# Patient Record
Sex: MALE | Race: WHITE | Employment: OTHER | URBAN - METROPOLITAN AREA
[De-identification: names, ages, dates, MRNs, and addresses within clinical notes are randomized per-mention and may not be internally consistent; named-entity substitution may affect disease eponyms.]

---

## 2017-11-10 NOTE — LETTER
Tanesha Hightower [address] [facility] [address], [address] [place] [address]    Consent for Operation  Date: __5/31/19________________                                Time: ______0940_________    1.  I authorize the performance upon Didi Shaw the following operation procedure has been videotaped, the surgeon will obtain the original videotape. The hospital will not be responsible for storage or maintenance of this tape.   7. For the purpose of advancing medical education, I consent to the admittance of observers to the STATEMENTS REQUIRING INSERTION OR COMPLETION WERE FILLED IN.     Signature of Patient:   ___________________________    When the patient is a minor or mentally incompetent to give consent:  Signature of person authorized to consent for patient: ____________ supplements, and pills I can buy without a prescription (including street drugs/illegal medications). Failure to inform my anesthesiologist about these medicines may increase my risk of anesthetic complications. iv.  If I am allergic to anything or have ha Anesthesiologist Signature     Date   Time  I have discussed the procedure and information above with the patient (or patient’s representative) and answered their questions. The patient or their representative has agreed to have anesthesia services.     ___ epigastric area

## 2018-01-26 PROBLEM — K21.9 GASTROESOPHAGEAL REFLUX DISEASE WITHOUT ESOPHAGITIS: Status: ACTIVE | Noted: 2018-01-26

## 2018-02-12 RX ORDER — PANTOPRAZOLE SODIUM 40 MG/1
40 TABLET, DELAYED RELEASE ORAL
Status: ON HOLD | COMMUNITY
End: 2019-01-01

## 2018-02-12 RX ORDER — TRAMADOL HYDROCHLORIDE 50 MG/1
50 TABLET ORAL EVERY 12 HOURS PRN
Status: ON HOLD | COMMUNITY
End: 2019-01-01

## 2018-02-12 RX ORDER — CHOLECALCIFEROL (VITAMIN D3) 50 MCG
1 TABLET ORAL DAILY
Status: ON HOLD | COMMUNITY
End: 2019-01-01

## 2018-02-12 RX ORDER — GABAPENTIN 300 MG/1
300 CAPSULE ORAL 2 TIMES DAILY
Status: ON HOLD | COMMUNITY
End: 2019-01-01

## 2018-02-13 ENCOUNTER — HOSPITAL ENCOUNTER (OUTPATIENT)
Facility: HOSPITAL | Age: 58
Setting detail: HOSPITAL OUTPATIENT SURGERY
Discharge: HOME | End: 2018-02-13
Attending: INTERNAL MEDICINE | Admitting: INTERNAL MEDICINE
Payer: MEDICAID

## 2018-02-13 ENCOUNTER — ANESTHESIA (OUTPATIENT)
Dept: ENDOSCOPY | Facility: HOSPITAL | Age: 58
End: 2018-02-13
Payer: MEDICAID

## 2018-02-13 ENCOUNTER — SURGERY (OUTPATIENT)
Age: 58
End: 2018-02-13

## 2018-02-13 ENCOUNTER — ANESTHESIA EVENT (OUTPATIENT)
Dept: ENDOSCOPY | Facility: HOSPITAL | Age: 58
End: 2018-02-13
Payer: MEDICAID

## 2018-02-13 DIAGNOSIS — K21.9 GASTROESOPHAGEAL REFLUX DISEASE WITHOUT ESOPHAGITIS: ICD-10-CM

## 2018-02-13 DIAGNOSIS — Z12.11 COLON CANCER SCREENING: ICD-10-CM

## 2018-02-13 PROCEDURE — 88312 SPECIAL STAINS GROUP 1: CPT | Performed by: INTERNAL MEDICINE

## 2018-02-13 PROCEDURE — 0DB68ZX EXCISION OF STOMACH, VIA NATURAL OR ARTIFICIAL OPENING ENDOSCOPIC, DIAGNOSTIC: ICD-10-PCS | Performed by: INTERNAL MEDICINE

## 2018-02-13 PROCEDURE — 06L38CZ OCCLUSION OF ESOPHAGEAL VEIN WITH EXTRALUMINAL DEVICE, VIA NATURAL OR ARTIFICIAL OPENING ENDOSCOPIC: ICD-10-PCS | Performed by: INTERNAL MEDICINE

## 2018-02-13 PROCEDURE — 88305 TISSUE EXAM BY PATHOLOGIST: CPT | Performed by: INTERNAL MEDICINE

## 2018-02-13 PROCEDURE — 0DJD8ZZ INSPECTION OF LOWER INTESTINAL TRACT, VIA NATURAL OR ARTIFICIAL OPENING ENDOSCOPIC: ICD-10-PCS | Performed by: INTERNAL MEDICINE

## 2018-02-13 PROCEDURE — 0DB48ZX EXCISION OF ESOPHAGOGASTRIC JUNCTION, VIA NATURAL OR ARTIFICIAL OPENING ENDOSCOPIC, DIAGNOSTIC: ICD-10-PCS | Performed by: INTERNAL MEDICINE

## 2018-02-13 RX ORDER — SODIUM CHLORIDE, SODIUM LACTATE, POTASSIUM CHLORIDE, CALCIUM CHLORIDE 600; 310; 30; 20 MG/100ML; MG/100ML; MG/100ML; MG/100ML
INJECTION, SOLUTION INTRAVENOUS CONTINUOUS
Status: DISCONTINUED | OUTPATIENT
Start: 2018-02-13 | End: 2018-02-13

## 2018-02-13 RX ORDER — HYDROCODONE BITARTRATE AND ACETAMINOPHEN 5; 325 MG/1; MG/1
1 TABLET ORAL AS NEEDED
Status: DISCONTINUED | OUTPATIENT
Start: 2018-02-13 | End: 2018-02-13

## 2018-02-13 RX ORDER — GLYCOPYRROLATE 0.2 MG/ML
INJECTION, SOLUTION INTRAMUSCULAR; INTRAVENOUS AS NEEDED
Status: DISCONTINUED | OUTPATIENT
Start: 2018-02-13 | End: 2018-02-13 | Stop reason: SURG

## 2018-02-13 RX ORDER — MORPHINE SULFATE 4 MG/ML
2 INJECTION, SOLUTION INTRAMUSCULAR; INTRAVENOUS EVERY 10 MIN PRN
Status: DISCONTINUED | OUTPATIENT
Start: 2018-02-13 | End: 2018-02-13

## 2018-02-13 RX ORDER — CEFAZOLIN SODIUM 1 G/3ML
INJECTION, POWDER, FOR SOLUTION INTRAMUSCULAR; INTRAVENOUS AS NEEDED
Status: DISCONTINUED | OUTPATIENT
Start: 2018-02-13 | End: 2018-02-13 | Stop reason: SURG

## 2018-02-13 RX ORDER — MORPHINE SULFATE 4 MG/ML
4 INJECTION, SOLUTION INTRAMUSCULAR; INTRAVENOUS EVERY 10 MIN PRN
Status: DISCONTINUED | OUTPATIENT
Start: 2018-02-13 | End: 2018-02-13

## 2018-02-13 RX ORDER — LIDOCAINE HYDROCHLORIDE 10 MG/ML
INJECTION, SOLUTION EPIDURAL; INFILTRATION; INTRACAUDAL; PERINEURAL AS NEEDED
Status: DISCONTINUED | OUTPATIENT
Start: 2018-02-13 | End: 2018-02-13 | Stop reason: SURG

## 2018-02-13 RX ORDER — ONDANSETRON 2 MG/ML
4 INJECTION INTRAMUSCULAR; INTRAVENOUS ONCE AS NEEDED
Status: DISCONTINUED | OUTPATIENT
Start: 2018-02-13 | End: 2018-02-13

## 2018-02-13 RX ORDER — MORPHINE SULFATE 10 MG/ML
6 INJECTION, SOLUTION INTRAMUSCULAR; INTRAVENOUS EVERY 10 MIN PRN
Status: DISCONTINUED | OUTPATIENT
Start: 2018-02-13 | End: 2018-02-13

## 2018-02-13 RX ORDER — RANITIDINE 150 MG/1
150 TABLET ORAL 2 TIMES DAILY
Status: ON HOLD | COMMUNITY
End: 2019-01-01

## 2018-02-13 RX ORDER — HALOPERIDOL 5 MG/ML
0.25 INJECTION INTRAMUSCULAR ONCE AS NEEDED
Status: DISCONTINUED | OUTPATIENT
Start: 2018-02-13 | End: 2018-02-13

## 2018-02-13 RX ORDER — NALOXONE HYDROCHLORIDE 0.4 MG/ML
80 INJECTION, SOLUTION INTRAMUSCULAR; INTRAVENOUS; SUBCUTANEOUS AS NEEDED
Status: DISCONTINUED | OUTPATIENT
Start: 2018-02-13 | End: 2018-02-13

## 2018-02-13 RX ORDER — HYDROCODONE BITARTRATE AND ACETAMINOPHEN 5; 325 MG/1; MG/1
2 TABLET ORAL AS NEEDED
Status: DISCONTINUED | OUTPATIENT
Start: 2018-02-13 | End: 2018-02-13

## 2018-02-13 RX ADMIN — CEFAZOLIN SODIUM 2 G: 1 INJECTION, POWDER, FOR SOLUTION INTRAMUSCULAR; INTRAVENOUS at 09:00:00

## 2018-02-13 RX ADMIN — GLYCOPYRROLATE 0.2 MG: 0.2 INJECTION, SOLUTION INTRAMUSCULAR; INTRAVENOUS at 08:49:00

## 2018-02-13 RX ADMIN — SODIUM CHLORIDE, SODIUM LACTATE, POTASSIUM CHLORIDE, CALCIUM CHLORIDE: 600; 310; 30; 20 INJECTION, SOLUTION INTRAVENOUS at 08:45:00

## 2018-02-13 RX ADMIN — SODIUM CHLORIDE, SODIUM LACTATE, POTASSIUM CHLORIDE, CALCIUM CHLORIDE: 600; 310; 30; 20 INJECTION, SOLUTION INTRAVENOUS at 09:00:00

## 2018-02-13 RX ADMIN — LIDOCAINE HYDROCHLORIDE 100 MG: 10 INJECTION, SOLUTION EPIDURAL; INFILTRATION; INTRACAUDAL; PERINEURAL at 08:49:00

## 2018-02-13 NOTE — ANESTHESIA PREPROCEDURE EVALUATION
Anesthesia PreOp Note    HPI:     Drew Alvarado is a 62year old male who presents for preoperative consultation requested by: Roberto Avalos MD    Date of Surgery: 2/13/2018    Procedure(s):  COLONOSCOPY  ESOPHAGOGASTRODUODENOSCOPY (EGD)  Indicatio Smoking status: Current Every Day Smoker  0.50 Packs/day  For 30.00 Years     Smokeless tobacco: Never Used    Alcohol use Yes    Comment: 6 cans of beer or vodka shots daily    Drug use: No    Sexual activity: Not on file     Other Topics Concern   None

## 2018-02-13 NOTE — OPERATIVE REPORT
ESOPHAGOGASTRODUODENOSCOPY AND COLONOSCOPY REPORT    Patient Name:  Eddy Gordillo Record #: [id number]  YOB: 1960  Date of Procedure: 2/13/2018    Referring physician: No primary care provider on file.      EGD with biopsy and band Bowel preparation was excellent. The mucosa was carefully inspected upon withdrawal of the scope. Withdrawal time was 6 minutes. Aronchick 1. Findings:    The cecum was normal. The visualized colonic mucosa was normal with the exception of the findings

## 2018-02-13 NOTE — ANESTHESIA POSTPROCEDURE EVALUATION
Patient: Stephen Wilde    Procedure Summary     Date:  02/13/18 Room / Location:  [address] [place] [address] ENDOSCOPY 01 / [address] [place] [address] ENDOSCOPY    Anesthesia Start:  0845 Anesthesia Stop:  3789    Procedures:       COLONOSCOPY (N/A )      ESOPHAGOGASTRODUODENOSCOPY (EGD) (N/A ) Nicolle

## 2018-02-13 NOTE — H&P
PRE-PROCEDURE UPDATE    HPI: Drew Alvarado is a 62year old male. 8/2/1960. Patient presents for a colonoscopy and gastroscopy. ALLERGIES: Not on File      No current outpatient prescriptions on file.   Past Medical History:   Diagnosis Date   • B

## 2018-02-14 VITALS
HEART RATE: 97 BPM | WEIGHT: 180 LBS | OXYGEN SATURATION: 97 % | BODY MASS INDEX: 28.25 KG/M2 | DIASTOLIC BLOOD PRESSURE: 81 MMHG | RESPIRATION RATE: 21 BRPM | SYSTOLIC BLOOD PRESSURE: 141 MMHG | HEIGHT: 67 IN

## 2018-02-15 NOTE — PROGRESS NOTES
2/15/2018  Eh Bhakta  [id number]  [address] [address]    Dear Orly Shaw,       Here are the biopsy/pathology findings from your recent EGD (Upper  Endoscopy):    reflux esophagitis - an inflammation of the esophagus due to GERD    Follow-

## 2019-01-01 ENCOUNTER — OFFICE VISIT (OUTPATIENT)
Dept: INTERNAL MEDICINE CLINIC | Facility: CLINIC | Age: 59
End: 2019-01-01
Payer: MEDICAID

## 2019-01-01 ENCOUNTER — APPOINTMENT (OUTPATIENT)
Dept: GENERAL RADIOLOGY | Facility: HOSPITAL | Age: 59
DRG: 441 | End: 2019-01-01
Attending: EMERGENCY MEDICINE
Payer: MEDICAID

## 2019-01-01 ENCOUNTER — HOSPITAL ENCOUNTER (INPATIENT)
Facility: HOSPITAL | Age: 59
LOS: 6 days | DRG: 441 | End: 2019-01-01
Attending: HOSPITALIST | Admitting: HOSPITALIST
Payer: OTHER MISCELLANEOUS

## 2019-01-01 ENCOUNTER — ANESTHESIA (OUTPATIENT)
Dept: ENDOSCOPY | Facility: HOSPITAL | Age: 59
DRG: 432 | End: 2019-01-01
Payer: MEDICAID

## 2019-01-01 ENCOUNTER — APPOINTMENT (OUTPATIENT)
Dept: ULTRASOUND IMAGING | Facility: HOSPITAL | Age: 59
DRG: 433 | End: 2019-01-01
Attending: INTERNAL MEDICINE
Payer: MEDICAID

## 2019-01-01 ENCOUNTER — APPOINTMENT (OUTPATIENT)
Dept: ULTRASOUND IMAGING | Facility: HOSPITAL | Age: 59
DRG: 433 | End: 2019-01-01
Attending: NURSE PRACTITIONER
Payer: MEDICAID

## 2019-01-01 ENCOUNTER — ANESTHESIA EVENT (OUTPATIENT)
Dept: ENDOSCOPY | Facility: HOSPITAL | Age: 59
DRG: 432 | End: 2019-01-01
Payer: MEDICAID

## 2019-01-01 ENCOUNTER — HOSPITAL ENCOUNTER (INPATIENT)
Facility: HOSPITAL | Age: 59
LOS: 12 days | Discharge: HOSPICE | DRG: 441 | End: 2019-01-01
Attending: EMERGENCY MEDICINE | Admitting: INTERNAL MEDICINE
Payer: MEDICAID

## 2019-01-01 ENCOUNTER — APPOINTMENT (OUTPATIENT)
Dept: CV DIAGNOSTICS | Facility: HOSPITAL | Age: 59
DRG: 433 | End: 2019-01-01
Attending: HOSPITALIST
Payer: MEDICAID

## 2019-01-01 ENCOUNTER — APPOINTMENT (OUTPATIENT)
Dept: GENERAL RADIOLOGY | Facility: HOSPITAL | Age: 59
DRG: 433 | End: 2019-01-01
Payer: MEDICAID

## 2019-01-01 ENCOUNTER — APPOINTMENT (OUTPATIENT)
Dept: ULTRASOUND IMAGING | Facility: HOSPITAL | Age: 59
DRG: 441 | End: 2019-01-01
Attending: PHYSICIAN ASSISTANT
Payer: MEDICAID

## 2019-01-01 ENCOUNTER — APPOINTMENT (OUTPATIENT)
Dept: CT IMAGING | Facility: HOSPITAL | Age: 59
DRG: 441 | End: 2019-01-01
Attending: HOSPITALIST
Payer: MEDICAID

## 2019-01-01 ENCOUNTER — APPOINTMENT (OUTPATIENT)
Dept: ULTRASOUND IMAGING | Facility: HOSPITAL | Age: 59
DRG: 433 | End: 2019-01-01
Attending: HOSPITALIST
Payer: MEDICAID

## 2019-01-01 ENCOUNTER — APPOINTMENT (OUTPATIENT)
Dept: CT IMAGING | Facility: HOSPITAL | Age: 59
DRG: 441 | End: 2019-01-01
Attending: EMERGENCY MEDICINE
Payer: MEDICAID

## 2019-01-01 ENCOUNTER — HOSPITAL ENCOUNTER (INPATIENT)
Facility: HOSPITAL | Age: 59
LOS: 9 days | Discharge: HOME | DRG: 433 | End: 2019-01-01
Attending: EMERGENCY MEDICINE | Admitting: HOSPITALIST
Payer: MEDICAID

## 2019-01-01 ENCOUNTER — HOSPITAL ENCOUNTER (OUTPATIENT)
Facility: HOSPITAL | Age: 59
Setting detail: HOSPITAL OUTPATIENT SURGERY
Discharge: HOME | End: 2019-01-01
Attending: INTERNAL MEDICINE | Admitting: INTERNAL MEDICINE
Payer: MEDICAID

## 2019-01-01 ENCOUNTER — HOSPITAL ENCOUNTER (INPATIENT)
Facility: HOSPITAL | Age: 59
LOS: 4 days | Discharge: HOME | DRG: 432 | End: 2019-01-01
Attending: EMERGENCY MEDICINE | Admitting: HOSPITALIST
Payer: MEDICAID

## 2019-01-01 ENCOUNTER — APPOINTMENT (OUTPATIENT)
Dept: ULTRASOUND IMAGING | Facility: HOSPITAL | Age: 59
DRG: 432 | End: 2019-01-01
Attending: NURSE PRACTITIONER
Payer: MEDICAID

## 2019-01-01 ENCOUNTER — PATIENT OUTREACH (OUTPATIENT)
Dept: CASE MANAGEMENT | Age: 59
End: 2019-01-01

## 2019-01-01 ENCOUNTER — APPOINTMENT (OUTPATIENT)
Dept: ULTRASOUND IMAGING | Facility: HOSPITAL | Age: 59
DRG: 432 | End: 2019-01-01
Attending: HOSPITALIST
Payer: MEDICAID

## 2019-01-01 ENCOUNTER — APPOINTMENT (OUTPATIENT)
Dept: ULTRASOUND IMAGING | Facility: HOSPITAL | Age: 59
DRG: 441 | End: 2019-01-01
Attending: INTERNAL MEDICINE
Payer: MEDICAID

## 2019-01-01 VITALS
WEIGHT: 170.19 LBS | SYSTOLIC BLOOD PRESSURE: 109 MMHG | RESPIRATION RATE: 20 BRPM | HEIGHT: 68 IN | HEART RATE: 84 BPM | TEMPERATURE: 99 F | DIASTOLIC BLOOD PRESSURE: 54 MMHG | OXYGEN SATURATION: 93 % | BODY MASS INDEX: 25.79 KG/M2

## 2019-01-01 VITALS
HEIGHT: 67 IN | OXYGEN SATURATION: 95 % | SYSTOLIC BLOOD PRESSURE: 125 MMHG | RESPIRATION RATE: 18 BRPM | HEART RATE: 80 BPM | BODY MASS INDEX: 25.68 KG/M2 | TEMPERATURE: 98 F | DIASTOLIC BLOOD PRESSURE: 64 MMHG | WEIGHT: 163.63 LBS

## 2019-01-01 VITALS
DIASTOLIC BLOOD PRESSURE: 58 MMHG | BODY MASS INDEX: 25.91 KG/M2 | OXYGEN SATURATION: 90 % | RESPIRATION RATE: 18 BRPM | HEIGHT: 68 IN | SYSTOLIC BLOOD PRESSURE: 117 MMHG | HEART RATE: 78 BPM | WEIGHT: 171 LBS

## 2019-01-01 VITALS
SYSTOLIC BLOOD PRESSURE: 104 MMHG | HEART RATE: 70 BPM | OXYGEN SATURATION: 97 % | WEIGHT: 186.13 LBS | RESPIRATION RATE: 23 BRPM | BODY MASS INDEX: 28 KG/M2 | TEMPERATURE: 99 F | DIASTOLIC BLOOD PRESSURE: 46 MMHG

## 2019-01-01 VITALS
RESPIRATION RATE: 22 BRPM | OXYGEN SATURATION: 87 % | DIASTOLIC BLOOD PRESSURE: 48 MMHG | SYSTOLIC BLOOD PRESSURE: 123 MMHG | TEMPERATURE: 98 F | HEART RATE: 84 BPM

## 2019-01-01 VITALS
DIASTOLIC BLOOD PRESSURE: 56 MMHG | WEIGHT: 170.88 LBS | BODY MASS INDEX: 26.82 KG/M2 | HEIGHT: 67 IN | OXYGEN SATURATION: 98 % | HEART RATE: 81 BPM | SYSTOLIC BLOOD PRESSURE: 102 MMHG | TEMPERATURE: 98 F | RESPIRATION RATE: 18 BRPM

## 2019-01-01 DIAGNOSIS — R17 JAUNDICE: ICD-10-CM

## 2019-01-01 DIAGNOSIS — R53.1 WEAKNESS GENERALIZED: Primary | ICD-10-CM

## 2019-01-01 DIAGNOSIS — R06.02 SHORTNESS OF BREATH: ICD-10-CM

## 2019-01-01 DIAGNOSIS — D64.9 ANEMIA, UNSPECIFIED TYPE: ICD-10-CM

## 2019-01-01 DIAGNOSIS — R07.9 CHEST PAIN OF UNCERTAIN ETIOLOGY: Primary | ICD-10-CM

## 2019-01-01 DIAGNOSIS — K72.90 HEPATIC ENCEPHALOPATHY (HCC): ICD-10-CM

## 2019-01-01 DIAGNOSIS — K92.2 GASTROINTESTINAL HEMORRHAGE, UNSPECIFIED GASTROINTESTINAL HEMORRHAGE TYPE: Primary | ICD-10-CM

## 2019-01-01 DIAGNOSIS — K21.9 GASTROESOPHAGEAL REFLUX DISEASE, ESOPHAGITIS PRESENCE NOT SPECIFIED: ICD-10-CM

## 2019-01-01 DIAGNOSIS — E86.0 DEHYDRATION: ICD-10-CM

## 2019-01-01 DIAGNOSIS — R60.1 GENERALIZED EDEMA: ICD-10-CM

## 2019-01-01 DIAGNOSIS — K70.31 ALCOHOLIC CIRRHOSIS OF LIVER WITH ASCITES (HCC): ICD-10-CM

## 2019-01-01 DIAGNOSIS — I85.01 BLEEDING ESOPHAGEAL VARICES, UNSPECIFIED ESOPHAGEAL VARICES TYPE (HCC): Primary | ICD-10-CM

## 2019-01-01 LAB
7-AMINOCLONAZEPAM, URINE: <5 NG/ML
ALBUMIN SERPL-MCNC: 2.2 G/DL (ref 3.4–5)
ALBUMIN SERPL-MCNC: 2.3 G/DL (ref 3.4–5)
ALBUMIN SERPL-MCNC: 2.5 G/DL (ref 3.4–5)
ALBUMIN SERPL-MCNC: 2.6 G/DL (ref 3.4–5)
ALBUMIN SERPL-MCNC: 2.8 G/DL (ref 3.4–5)
ALBUMIN/GLOB SERPL: 0.6 {RATIO} (ref 1–2)
ALBUMIN/GLOB SERPL: 0.8 {RATIO} (ref 1–2)
ALBUMIN/GLOB SERPL: 0.9 {RATIO} (ref 1–2)
ALP LIVER SERPL-CCNC: 68 U/L (ref 45–117)
ALP LIVER SERPL-CCNC: 70 U/L (ref 45–117)
ALP LIVER SERPL-CCNC: 78 U/L (ref 45–117)
ALP LIVER SERPL-CCNC: 83 U/L (ref 45–117)
ALP LIVER SERPL-CCNC: 85 U/L (ref 45–117)
ALP LIVER SERPL-CCNC: 95 U/L (ref 45–117)
ALP LIVER SERPL-CCNC: 99 U/L (ref 45–117)
ALPHA-HYDROXYALPRAZOLAM, URINE: <5 NG/ML
ALPHA-HYDROXYMIDAZOLAM, URINE: <20 NG/ML
ALPRAZOLAM, URINE: <5 NG/ML
ALT SERPL-CCNC: 15 U/L (ref 16–61)
ALT SERPL-CCNC: 16 U/L (ref 16–61)
ALT SERPL-CCNC: 16 U/L (ref 16–61)
ALT SERPL-CCNC: 17 U/L (ref 16–61)
ALT SERPL-CCNC: 17 U/L (ref 16–61)
ALT SERPL-CCNC: 18 U/L (ref 16–61)
ALT SERPL-CCNC: 19 U/L (ref 16–61)
AMMONIA PLAS-MCNC: 43 UMOL/L (ref 11–32)
AMMONIA PLAS-MCNC: 44 UMOL/L (ref 11–32)
AMMONIA PLAS-MCNC: 54 UMOL/L (ref 11–32)
AMPHET UR QL SCN: NEGATIVE
ANION GAP SERPL CALC-SCNC: 6 MMOL/L (ref 0–18)
ANION GAP SERPL CALC-SCNC: 7 MMOL/L (ref 0–18)
ANION GAP SERPL CALC-SCNC: 8 MMOL/L (ref 0–18)
ANION GAP SERPL CALC-SCNC: 8 MMOL/L (ref 0–18)
APTT PPP: 35.3 SECONDS (ref 25.4–36.1)
AST SERPL-CCNC: 33 U/L (ref 15–37)
AST SERPL-CCNC: 35 U/L (ref 15–37)
AST SERPL-CCNC: 42 U/L (ref 15–37)
AST SERPL-CCNC: 44 U/L (ref 15–37)
AST SERPL-CCNC: 47 U/L (ref 15–37)
AST SERPL-CCNC: 48 U/L (ref 15–37)
AST SERPL-CCNC: 51 U/L (ref 15–37)
ATRIAL RATE: 81 BPM
BARBITURATES UR QL SCN: NEGATIVE
BASOPHIL PERITONEAL FLUID: 0 %
BASOPHILS # BLD AUTO: 0.02 X10(3) UL (ref 0–0.2)
BASOPHILS # BLD AUTO: 0.03 X10(3) UL (ref 0–0.2)
BASOPHILS # BLD AUTO: 0.03 X10(3) UL (ref 0–0.2)
BASOPHILS # BLD AUTO: 0.04 X10(3) UL (ref 0–0.2)
BASOPHILS # BLD AUTO: 0.05 X10(3) UL (ref 0–0.2)
BASOPHILS # BLD AUTO: 0.06 X10(3) UL (ref 0–0.2)
BASOPHILS NFR BLD AUTO: 0.3 %
BASOPHILS NFR BLD AUTO: 0.4 %
BASOPHILS NFR BLD AUTO: 0.5 %
BASOPHILS NFR BLD AUTO: 0.5 %
BASOPHILS NFR BLD AUTO: 0.6 %
BASOPHILS NFR BLD AUTO: 0.7 %
BILIRUB SERPL-MCNC: 10.3 MG/DL (ref 0.1–2)
BILIRUB SERPL-MCNC: 12 MG/DL (ref 0.1–2)
BILIRUB SERPL-MCNC: 12 MG/DL (ref 0.1–2)
BILIRUB SERPL-MCNC: 12.2 MG/DL (ref 0.1–2)
BILIRUB SERPL-MCNC: 12.2 MG/DL (ref 0.1–2)
BILIRUB SERPL-MCNC: 14 MG/DL (ref 0.1–2)
BILIRUB SERPL-MCNC: 14 MG/DL (ref 0.1–2)
BILIRUB UR QL STRIP.AUTO: NEGATIVE
BILIRUB UR QL STRIP.AUTO: NEGATIVE
BUN BLD-MCNC: 10 MG/DL (ref 7–18)
BUN BLD-MCNC: 10 MG/DL (ref 7–18)
BUN BLD-MCNC: 11 MG/DL (ref 7–18)
BUN BLD-MCNC: 9 MG/DL (ref 7–18)
BUN BLD-MCNC: 9 MG/DL (ref 7–18)
BUN/CREAT SERPL: 6.6 (ref 10–20)
BUN/CREAT SERPL: 6.9 (ref 10–20)
BUN/CREAT SERPL: 7.1 (ref 10–20)
BUN/CREAT SERPL: 7.3 (ref 10–20)
BUN/CREAT SERPL: 7.9 (ref 10–20)
BUN/CREAT SERPL: 7.9 (ref 10–20)
BUN/CREAT SERPL: 8.5 (ref 10–20)
CALCIUM BLD-MCNC: 8.2 MG/DL (ref 8.5–10.1)
CALCIUM BLD-MCNC: 8.5 MG/DL (ref 8.5–10.1)
CALCIUM BLD-MCNC: 8.6 MG/DL (ref 8.5–10.1)
CALCIUM BLD-MCNC: 8.6 MG/DL (ref 8.5–10.1)
CALCIUM BLD-MCNC: 8.7 MG/DL (ref 8.5–10.1)
CALCIUM BLD-MCNC: 8.9 MG/DL (ref 8.5–10.1)
CALCIUM BLD-MCNC: 9.1 MG/DL (ref 8.5–10.1)
CANNABINOIDS UR QL SCN: NEGATIVE
CHLORDIAZEPOXIDE, URINE: 834 NG/ML
CHLORIDE SERPL-SCNC: 100 MMOL/L (ref 98–112)
CHLORIDE SERPL-SCNC: 101 MMOL/L (ref 98–112)
CHLORIDE SERPL-SCNC: 102 MMOL/L (ref 98–112)
CHLORIDE SERPL-SCNC: 97 MMOL/L (ref 98–112)
CHLORIDE SERPL-SCNC: 99 MMOL/L (ref 98–112)
CLARITY UR REFRACT.AUTO: CLEAR
CLARITY UR REFRACT.AUTO: CLEAR
CLONAZEPAM, URINE: <5 NG/ML
CO2 SERPL-SCNC: 26 MMOL/L (ref 21–32)
CO2 SERPL-SCNC: 27 MMOL/L (ref 21–32)
CO2 SERPL-SCNC: 29 MMOL/L (ref 21–32)
CO2 SERPL-SCNC: 29 MMOL/L (ref 21–32)
COCAINE UR QL: NEGATIVE
COLOR UR AUTO: YELLOW
CREAT BLD-MCNC: 1.14 MG/DL (ref 0.7–1.3)
CREAT BLD-MCNC: 1.3 MG/DL (ref 0.7–1.3)
CREAT BLD-MCNC: 1.37 MG/DL (ref 0.7–1.3)
CREAT BLD-MCNC: 1.37 MG/DL (ref 0.7–1.3)
CREAT BLD-MCNC: 1.39 MG/DL (ref 0.7–1.3)
CREAT BLD-MCNC: 1.45 MG/DL (ref 0.7–1.3)
CREAT BLD-MCNC: 1.55 MG/DL (ref 0.7–1.3)
CREAT UR-SCNC: 117 MG/DL
DEPRECATED RDW RBC AUTO: 64.1 FL (ref 35.1–46.3)
DEPRECATED RDW RBC AUTO: 65.2 FL (ref 35.1–46.3)
DEPRECATED RDW RBC AUTO: 65.9 FL (ref 35.1–46.3)
DEPRECATED RDW RBC AUTO: 66 FL (ref 35.1–46.3)
DEPRECATED RDW RBC AUTO: 66.4 FL (ref 35.1–46.3)
DEPRECATED RDW RBC AUTO: 69.3 FL (ref 35.1–46.3)
DIAZEPAM, URINE: <20 NG/ML
EOSINOPHIL # BLD AUTO: 0.05 X10(3) UL (ref 0–0.7)
EOSINOPHIL # BLD AUTO: 0.06 X10(3) UL (ref 0–0.7)
EOSINOPHIL # BLD AUTO: 0.06 X10(3) UL (ref 0–0.7)
EOSINOPHIL # BLD AUTO: 0.08 X10(3) UL (ref 0–0.7)
EOSINOPHIL # BLD AUTO: 0.08 X10(3) UL (ref 0–0.7)
EOSINOPHIL # BLD AUTO: 0.14 X10(3) UL (ref 0–0.7)
EOSINOPHIL NFR BLD AUTO: 0.5 %
EOSINOPHIL NFR BLD AUTO: 0.8 %
EOSINOPHIL NFR BLD AUTO: 0.8 %
EOSINOPHIL NFR BLD AUTO: 1.1 %
EOSINOPHIL NFR BLD AUTO: 1.2 %
EOSINOPHIL NFR BLD AUTO: 2.2 %
EOSINOPHILS PERITONEAL FLUID: 0 %
ERYTHROCYTE [DISTWIDTH] IN BLOOD BY AUTOMATED COUNT: 15.6 % (ref 11–15)
ERYTHROCYTE [DISTWIDTH] IN BLOOD BY AUTOMATED COUNT: 15.8 % (ref 11–15)
ERYTHROCYTE [DISTWIDTH] IN BLOOD BY AUTOMATED COUNT: 15.9 % (ref 11–15)
ERYTHROCYTE [DISTWIDTH] IN BLOOD BY AUTOMATED COUNT: 15.9 % (ref 11–15)
ERYTHROCYTE [DISTWIDTH] IN BLOOD BY AUTOMATED COUNT: 16.1 % (ref 11–15)
ERYTHROCYTE [DISTWIDTH] IN BLOOD BY AUTOMATED COUNT: 16.3 % (ref 11–15)
ETHANOL SERPL-MCNC: <3 MG/DL (ref ?–3)
ETHANOL UR-MCNC: NEGATIVE MG/DL
GLOBULIN PLAS-MCNC: 3.2 G/DL (ref 2.8–4.4)
GLOBULIN PLAS-MCNC: 3.3 G/DL (ref 2.8–4.4)
GLOBULIN PLAS-MCNC: 3.9 G/DL (ref 2.8–4.4)
GLOBULIN PLAS-MCNC: 4.5 G/DL (ref 2.8–4.4)
GLUCOSE BLD-MCNC: 100 MG/DL (ref 70–99)
GLUCOSE BLD-MCNC: 104 MG/DL (ref 70–99)
GLUCOSE BLD-MCNC: 106 MG/DL (ref 70–99)
GLUCOSE BLD-MCNC: 109 MG/DL (ref 70–99)
GLUCOSE BLD-MCNC: 112 MG/DL (ref 70–99)
GLUCOSE BLD-MCNC: 117 MG/DL (ref 70–99)
GLUCOSE BLD-MCNC: 128 MG/DL (ref 70–99)
GLUCOSE UR STRIP.AUTO-MCNC: NEGATIVE MG/DL
GLUCOSE UR STRIP.AUTO-MCNC: NEGATIVE MG/DL
HAV IGM SER QL: 2.1 MG/DL (ref 1.6–2.6)
HCT VFR BLD AUTO: 22.2 % (ref 39–53)
HCT VFR BLD AUTO: 23.1 % (ref 39–53)
HCT VFR BLD AUTO: 23.6 % (ref 39–53)
HCT VFR BLD AUTO: 23.7 % (ref 39–53)
HCT VFR BLD AUTO: 23.7 % (ref 39–53)
HCT VFR BLD AUTO: 26.5 % (ref 39–53)
HGB BLD-MCNC: 7.6 G/DL (ref 13–17.5)
HGB BLD-MCNC: 7.9 G/DL (ref 13–17.5)
HGB BLD-MCNC: 8 G/DL (ref 13–17.5)
HGB BLD-MCNC: 8.1 G/DL (ref 13–17.5)
HGB BLD-MCNC: 8.2 G/DL (ref 13–17.5)
HGB BLD-MCNC: 9.3 G/DL (ref 13–17.5)
HYALINE CASTS #/AREA URNS AUTO: PRESENT /LPF
IMM GRANULOCYTES # BLD AUTO: 0.03 X10(3) UL (ref 0–1)
IMM GRANULOCYTES # BLD AUTO: 0.04 X10(3) UL (ref 0–1)
IMM GRANULOCYTES # BLD AUTO: 0.05 X10(3) UL (ref 0–1)
IMM GRANULOCYTES # BLD AUTO: 0.06 X10(3) UL (ref 0–1)
IMM GRANULOCYTES NFR BLD: 0.5 %
IMM GRANULOCYTES NFR BLD: 0.6 %
IMM GRANULOCYTES NFR BLD: 0.7 %
INR BLD: 2.05 (ref 0.9–1.1)
INR BLD: 2.27 (ref 0.9–1.1)
INR BLD: 2.29 (ref 0.9–1.1)
INR BLD: 2.34 (ref 0.9–1.1)
KETONES UR STRIP.AUTO-MCNC: NEGATIVE MG/DL
KETONES UR STRIP.AUTO-MCNC: NEGATIVE MG/DL
LEUKOCYTE ESTERASE UR QL STRIP.AUTO: NEGATIVE
LEUKOCYTE ESTERASE UR QL STRIP.AUTO: NEGATIVE
LORAZEPAM, URINE: <20 NG/ML
LYMPHOCYTE PERITONEAL FLUID: 23 %
LYMPHOCYTES # BLD AUTO: 0.9 X10(3) UL (ref 1–4)
LYMPHOCYTES # BLD AUTO: 1.34 X10(3) UL (ref 1–4)
LYMPHOCYTES # BLD AUTO: 1.41 X10(3) UL (ref 1–4)
LYMPHOCYTES # BLD AUTO: 1.45 X10(3) UL (ref 1–4)
LYMPHOCYTES # BLD AUTO: 1.53 X10(3) UL (ref 1–4)
LYMPHOCYTES # BLD AUTO: 1.56 X10(3) UL (ref 1–4)
LYMPHOCYTES NFR BLD AUTO: 20.2 %
LYMPHOCYTES NFR BLD AUTO: 20.3 %
LYMPHOCYTES NFR BLD AUTO: 20.4 %
LYMPHOCYTES NFR BLD AUTO: 21.1 %
LYMPHOCYTES NFR BLD AUTO: 21.5 %
LYMPHOCYTES NFR BLD AUTO: 9.7 %
M PROTEIN MFR SERPL ELPH: 5.8 G/DL (ref 6.4–8.2)
M PROTEIN MFR SERPL ELPH: 6 G/DL (ref 6.4–8.2)
M PROTEIN MFR SERPL ELPH: 6.1 G/DL (ref 6.4–8.2)
M PROTEIN MFR SERPL ELPH: 6.2 G/DL (ref 6.4–8.2)
M PROTEIN MFR SERPL ELPH: 7.1 G/DL (ref 6.4–8.2)
MCH RBC QN AUTO: 38.5 PG (ref 26–34)
MCH RBC QN AUTO: 38.8 PG (ref 26–34)
MCH RBC QN AUTO: 38.9 PG (ref 26–34)
MCH RBC QN AUTO: 38.9 PG (ref 26–34)
MCH RBC QN AUTO: 39.2 PG (ref 26–34)
MCH RBC QN AUTO: 39.2 PG (ref 26–34)
MCHC RBC AUTO-ENTMCNC: 33.9 G/DL (ref 31–37)
MCHC RBC AUTO-ENTMCNC: 34.2 G/DL (ref 31–37)
MCHC RBC AUTO-ENTMCNC: 34.6 G/DL (ref 31–37)
MCHC RBC AUTO-ENTMCNC: 35.1 G/DL (ref 31–37)
MCV RBC AUTO: 111.8 FL (ref 80–100)
MCV RBC AUTO: 112.3 FL (ref 80–100)
MCV RBC AUTO: 112.7 FL (ref 80–100)
MCV RBC AUTO: 113.9 FL (ref 80–100)
MCV RBC AUTO: 114.4 FL (ref 80–100)
MCV RBC AUTO: 114.6 FL (ref 80–100)
MESOTHELIAL PERITONEAL FLUID: 7 %
MIDAZOLAM, URINE: <20 NG/ML
MONO/MAC/HISTIO PERITONEAL: 64 %
MONOCYTES # BLD AUTO: 0.79 X10(3) UL (ref 0.1–1)
MONOCYTES # BLD AUTO: 0.85 X10(3) UL (ref 0.1–1)
MONOCYTES # BLD AUTO: 0.9 X10(3) UL (ref 0.1–1)
MONOCYTES # BLD AUTO: 0.92 X10(3) UL (ref 0.1–1)
MONOCYTES # BLD AUTO: 0.98 X10(3) UL (ref 0.1–1)
MONOCYTES # BLD AUTO: 1.08 X10(3) UL (ref 0.1–1)
MONOCYTES NFR BLD AUTO: 11.1 %
MONOCYTES NFR BLD AUTO: 12.7 %
MONOCYTES NFR BLD AUTO: 13 %
MONOCYTES NFR BLD AUTO: 14.2 %
MONOCYTES NFR BLD AUTO: 15.4 %
MONOCYTES NFR BLD AUTO: 9.1 %
NEUTROPHILS # BLD AUTO: 3.83 X10 (3) UL (ref 1.5–7.7)
NEUTROPHILS # BLD AUTO: 3.83 X10(3) UL (ref 1.5–7.7)
NEUTROPHILS # BLD AUTO: 4.45 X10 (3) UL (ref 1.5–7.7)
NEUTROPHILS # BLD AUTO: 4.45 X10(3) UL (ref 1.5–7.7)
NEUTROPHILS # BLD AUTO: 4.64 X10 (3) UL (ref 1.5–7.7)
NEUTROPHILS # BLD AUTO: 4.64 X10(3) UL (ref 1.5–7.7)
NEUTROPHILS # BLD AUTO: 4.72 X10 (3) UL (ref 1.5–7.7)
NEUTROPHILS # BLD AUTO: 4.72 X10(3) UL (ref 1.5–7.7)
NEUTROPHILS # BLD AUTO: 4.82 X10 (3) UL (ref 1.5–7.7)
NEUTROPHILS # BLD AUTO: 4.82 X10(3) UL (ref 1.5–7.7)
NEUTROPHILS # BLD AUTO: 7.37 X10 (3) UL (ref 1.5–7.7)
NEUTROPHILS # BLD AUTO: 7.37 X10(3) UL (ref 1.5–7.7)
NEUTROPHILS NFR BLD AUTO: 60.3 %
NEUTROPHILS NFR BLD AUTO: 63.6 %
NEUTROPHILS NFR BLD AUTO: 64.1 %
NEUTROPHILS NFR BLD AUTO: 64.4 %
NEUTROPHILS NFR BLD AUTO: 66.2 %
NEUTROPHILS NFR BLD AUTO: 79.5 %
NEUTROPHILS PERITONEAL FLUID: 6 %
NITRITE UR QL STRIP.AUTO: NEGATIVE
NITRITE UR QL STRIP.AUTO: NEGATIVE
NORDIAZEPAM, URINE: 76 NG/ML
OPIATES UR QL SCN: NEGATIVE
OSMOLALITY SERPL CALC.SUM OF ELEC: 274 MOSM/KG (ref 275–295)
OSMOLALITY SERPL CALC.SUM OF ELEC: 275 MOSM/KG (ref 275–295)
OSMOLALITY SERPL CALC.SUM OF ELEC: 278 MOSM/KG (ref 275–295)
OSMOLALITY SERPL CALC.SUM OF ELEC: 279 MOSM/KG (ref 275–295)
OSMOLALITY SERPL CALC.SUM OF ELEC: 282 MOSM/KG (ref 275–295)
OSMOLALITY SERPL CALC.SUM OF ELEC: 282 MOSM/KG (ref 275–295)
OSMOLALITY SERPL CALC.SUM OF ELEC: 283 MOSM/KG (ref 275–295)
OXAZEPAM, URINE: 457 NG/ML
P AXIS: 50 DEGREES
P-R INTERVAL: 130 MS
PCP UR QL SCN: NEGATIVE
PH UR STRIP.AUTO: 5 [PH] (ref 4.5–8)
PH UR STRIP.AUTO: 5 [PH] (ref 4.5–8)
PLATELET # BLD AUTO: 56 10(3)UL (ref 150–450)
PLATELET # BLD AUTO: 65 10(3)UL (ref 150–450)
PLATELET # BLD AUTO: 65 10(3)UL (ref 150–450)
PLATELET # BLD AUTO: 72 10(3)UL (ref 150–450)
PLATELET # BLD AUTO: 73 10(3)UL (ref 150–450)
PLATELET # BLD AUTO: 76 10(3)UL (ref 150–450)
PLATELET MORPHOLOGY: NORMAL
POTASSIUM SERPL-SCNC: 3.5 MMOL/L (ref 3.5–5.1)
POTASSIUM SERPL-SCNC: 3.5 MMOL/L (ref 3.5–5.1)
POTASSIUM SERPL-SCNC: 3.6 MMOL/L (ref 3.5–5.1)
POTASSIUM SERPL-SCNC: 3.7 MMOL/L (ref 3.5–5.1)
POTASSIUM SERPL-SCNC: 3.9 MMOL/L (ref 3.5–5.1)
POTASSIUM SERPL-SCNC: 4 MMOL/L (ref 3.5–5.1)
POTASSIUM SERPL-SCNC: 4.2 MMOL/L (ref 3.5–5.1)
PROT UR STRIP.AUTO-MCNC: NEGATIVE MG/DL
PROT UR STRIP.AUTO-MCNC: NEGATIVE MG/DL
PSA SERPL DL<=0.01 NG/ML-MCNC: 24.4 SECONDS (ref 12.5–14.7)
PSA SERPL DL<=0.01 NG/ML-MCNC: 26.5 SECONDS (ref 12.5–14.7)
PSA SERPL DL<=0.01 NG/ML-MCNC: 26.7 SECONDS (ref 12.5–14.7)
PSA SERPL DL<=0.01 NG/ML-MCNC: 27.1 SECONDS (ref 12.5–14.7)
Q-T INTERVAL: 408 MS
QRS DURATION: 88 MS
QTC CALCULATION (BEZET): 473 MS
R AXIS: -8 DEGREES
RBC # BLD AUTO: 1.94 X10(6)UL (ref 4.3–5.7)
RBC # BLD AUTO: 2.05 X10(6)UL (ref 4.3–5.7)
RBC # BLD AUTO: 2.06 X10(6)UL (ref 4.3–5.7)
RBC # BLD AUTO: 2.08 X10(6)UL (ref 4.3–5.7)
RBC # BLD AUTO: 2.11 X10(6)UL (ref 4.3–5.7)
RBC # BLD AUTO: 2.37 X10(6)UL (ref 4.3–5.7)
RBC PERITONEAL FLUID: <3000 /MM3
RBC UR QL AUTO: NEGATIVE
SODIUM SERPL-SCNC: 132 MMOL/L (ref 136–145)
SODIUM SERPL-SCNC: 133 MMOL/L (ref 136–145)
SODIUM SERPL-SCNC: 134 MMOL/L (ref 136–145)
SODIUM SERPL-SCNC: 135 MMOL/L (ref 136–145)
SODIUM SERPL-SCNC: 136 MMOL/L (ref 136–145)
SP GR UR STRIP.AUTO: 1.01 (ref 1–1.03)
SP GR UR STRIP.AUTO: 1.01 (ref 1–1.03)
T AXIS: 21 DEGREES
TEMAZEPAM, URINE: 93 NG/ML
TOTAL CELLS COUNTED: 100
UROBILINOGEN UR STRIP.AUTO-MCNC: 2 MG/DL
UROBILINOGEN UR STRIP.AUTO-MCNC: 4 MG/DL
VENTRICULAR RATE: 81 BPM
WBC # BLD AUTO: 6.4 X10(3) UL (ref 4–11)
WBC # BLD AUTO: 6.9 X10(3) UL (ref 4–11)
WBC # BLD AUTO: 7.1 X10(3) UL (ref 4–11)
WBC # BLD AUTO: 7.2 X10(3) UL (ref 4–11)
WBC # BLD AUTO: 7.6 X10(3) UL (ref 4–11)
WBC # BLD AUTO: 9.3 X10(3) UL (ref 4–11)
WBC PERITONEAL FLUID: 100 /MM3

## 2019-01-01 PROCEDURE — 0W9G3ZZ DRAINAGE OF PERITONEAL CAVITY, PERCUTANEOUS APPROACH: ICD-10-PCS | Performed by: RADIOLOGY

## 2019-01-01 PROCEDURE — 99232 SBSQ HOSP IP/OBS MODERATE 35: CPT | Performed by: INTERNAL MEDICINE

## 2019-01-01 PROCEDURE — 99232 SBSQ HOSP IP/OBS MODERATE 35: CPT | Performed by: HOSPITALIST

## 2019-01-01 PROCEDURE — 49083 ABD PARACENTESIS W/IMAGING: CPT | Performed by: HOSPITALIST

## 2019-01-01 PROCEDURE — 76705 ECHO EXAM OF ABDOMEN: CPT | Performed by: HOSPITALIST

## 2019-01-01 PROCEDURE — 70450 CT HEAD/BRAIN W/O DYE: CPT | Performed by: EMERGENCY MEDICINE

## 2019-01-01 PROCEDURE — 90792 PSYCH DIAG EVAL W/MED SRVCS: CPT | Performed by: OTHER

## 2019-01-01 PROCEDURE — 99231 SBSQ HOSP IP/OBS SF/LOW 25: CPT | Performed by: HOSPITALIST

## 2019-01-01 PROCEDURE — 30233H1 TRANSFUSION OF NONAUTOLOGOUS WHOLE BLOOD INTO PERIPHERAL VEIN, PERCUTANEOUS APPROACH: ICD-10-PCS | Performed by: HOSPITALIST

## 2019-01-01 PROCEDURE — 99231 SBSQ HOSP IP/OBS SF/LOW 25: CPT | Performed by: CLINICAL NURSE SPECIALIST

## 2019-01-01 PROCEDURE — 49083 ABD PARACENTESIS W/IMAGING: CPT | Performed by: NURSE PRACTITIONER

## 2019-01-01 PROCEDURE — 99232 SBSQ HOSP IP/OBS MODERATE 35: CPT | Performed by: CLINICAL NURSE SPECIALIST

## 2019-01-01 PROCEDURE — 49083 ABD PARACENTESIS W/IMAGING: CPT | Performed by: INTERNAL MEDICINE

## 2019-01-01 PROCEDURE — 99233 SBSQ HOSP IP/OBS HIGH 50: CPT | Performed by: HOSPITALIST

## 2019-01-01 PROCEDURE — 99232 SBSQ HOSP IP/OBS MODERATE 35: CPT | Performed by: OTHER

## 2019-01-01 PROCEDURE — 93975 VASCULAR STUDY: CPT | Performed by: INTERNAL MEDICINE

## 2019-01-01 PROCEDURE — 99223 1ST HOSP IP/OBS HIGH 75: CPT | Performed by: INTERNAL MEDICINE

## 2019-01-01 PROCEDURE — 06L38CZ OCCLUSION OF ESOPHAGEAL VEIN WITH EXTRALUMINAL DEVICE, VIA NATURAL OR ARTIFICIAL OPENING ENDOSCOPIC: ICD-10-PCS | Performed by: INTERNAL MEDICINE

## 2019-01-01 PROCEDURE — 30233N1 TRANSFUSION OF NONAUTOLOGOUS RED BLOOD CELLS INTO PERIPHERAL VEIN, PERCUTANEOUS APPROACH: ICD-10-PCS | Performed by: HOSPITALIST

## 2019-01-01 PROCEDURE — 99253 IP/OBS CNSLTJ NEW/EST LOW 45: CPT | Performed by: CLINICAL NURSE SPECIALIST

## 2019-01-01 PROCEDURE — 93306 TTE W/DOPPLER COMPLETE: CPT | Performed by: HOSPITALIST

## 2019-01-01 PROCEDURE — 76700 US EXAM ABDOM COMPLETE: CPT | Performed by: INTERNAL MEDICINE

## 2019-01-01 PROCEDURE — 99233 SBSQ HOSP IP/OBS HIGH 50: CPT | Performed by: CLINICAL NURSE SPECIALIST

## 2019-01-01 PROCEDURE — 71045 X-RAY EXAM CHEST 1 VIEW: CPT

## 2019-01-01 PROCEDURE — 99239 HOSP IP/OBS DSCHRG MGMT >30: CPT | Performed by: INTERNAL MEDICINE

## 2019-01-01 PROCEDURE — 70450 CT HEAD/BRAIN W/O DYE: CPT | Performed by: HOSPITALIST

## 2019-01-01 PROCEDURE — 49083 ABD PARACENTESIS W/IMAGING: CPT | Performed by: PHYSICIAN ASSISTANT

## 2019-01-01 PROCEDURE — 99291 CRITICAL CARE FIRST HOUR: CPT | Performed by: NURSE PRACTITIONER

## 2019-01-01 PROCEDURE — HZ2ZZZZ DETOXIFICATION SERVICES FOR SUBSTANCE ABUSE TREATMENT: ICD-10-PCS | Performed by: HOSPITALIST

## 2019-01-01 PROCEDURE — 99239 HOSP IP/OBS DSCHRG MGMT >30: CPT | Performed by: HOSPITALIST

## 2019-01-01 PROCEDURE — 99356 PROLONGED SERV,INPATIENT,1ST HR: CPT | Performed by: HOSPITALIST

## 2019-01-01 PROCEDURE — 99214 OFFICE O/P EST MOD 30 MIN: CPT | Performed by: CLINICAL NURSE SPECIALIST

## 2019-01-01 PROCEDURE — 99223 1ST HOSP IP/OBS HIGH 75: CPT | Performed by: HOSPITALIST

## 2019-01-01 PROCEDURE — 99254 IP/OBS CNSLTJ NEW/EST MOD 60: CPT | Performed by: CLINICAL NURSE SPECIALIST

## 2019-01-01 PROCEDURE — 0W3P8ZZ CONTROL BLEEDING IN GASTROINTESTINAL TRACT, VIA NATURAL OR ARTIFICIAL OPENING ENDOSCOPIC: ICD-10-PCS | Performed by: INTERNAL MEDICINE

## 2019-01-01 PROCEDURE — 71045 X-RAY EXAM CHEST 1 VIEW: CPT | Performed by: EMERGENCY MEDICINE

## 2019-01-01 RX ORDER — LORAZEPAM 2 MG/ML
3 INJECTION INTRAMUSCULAR
Status: DISCONTINUED | OUTPATIENT
Start: 2019-01-01 | End: 2019-01-01

## 2019-01-01 RX ORDER — ARIPIPRAZOLE 5 MG/1
2.5 TABLET ORAL 2 TIMES DAILY
Status: DISCONTINUED | OUTPATIENT
Start: 2019-01-01 | End: 2019-01-01

## 2019-01-01 RX ORDER — HALOPERIDOL 5 MG/ML
1 INJECTION INTRAMUSCULAR EVERY 2 HOUR PRN
Status: DISCONTINUED | OUTPATIENT
Start: 2019-01-01 | End: 2019-01-01

## 2019-01-01 RX ORDER — MELATONIN
100 DAILY
Status: DISCONTINUED | OUTPATIENT
Start: 2019-01-01 | End: 2019-01-01

## 2019-01-01 RX ORDER — LACTULOSE 20 G/30ML
20 SOLUTION ORAL 2 TIMES DAILY
Status: DISCONTINUED | OUTPATIENT
Start: 2019-01-01 | End: 2019-01-01

## 2019-01-01 RX ORDER — ONDANSETRON 2 MG/ML
INJECTION INTRAMUSCULAR; INTRAVENOUS
Status: COMPLETED
Start: 2019-01-01 | End: 2019-01-01

## 2019-01-01 RX ORDER — ALBUMIN (HUMAN) 12.5 G/50ML
100 SOLUTION INTRAVENOUS EVERY 12 HOURS
Status: COMPLETED | OUTPATIENT
Start: 2019-01-01 | End: 2019-01-01

## 2019-01-01 RX ORDER — LORAZEPAM 2 MG/ML
2 INJECTION INTRAMUSCULAR
Status: DISCONTINUED | OUTPATIENT
Start: 2019-01-01 | End: 2019-01-01

## 2019-01-01 RX ORDER — SODIUM CHLORIDE, SODIUM LACTATE, POTASSIUM CHLORIDE, CALCIUM CHLORIDE 600; 310; 30; 20 MG/100ML; MG/100ML; MG/100ML; MG/100ML
INJECTION, SOLUTION INTRAVENOUS CONTINUOUS
Status: DISCONTINUED | OUTPATIENT
Start: 2019-01-01 | End: 2019-01-01

## 2019-01-01 RX ORDER — GABAPENTIN 300 MG/1
300 CAPSULE ORAL 2 TIMES DAILY
Qty: 60 CAPSULE | Refills: 0 | Status: ON HOLD | OUTPATIENT
Start: 2019-01-01 | End: 2019-01-01

## 2019-01-01 RX ORDER — PANTOPRAZOLE SODIUM 40 MG/1
40 TABLET, DELAYED RELEASE ORAL
Status: DISCONTINUED | OUTPATIENT
Start: 2019-01-01 | End: 2019-01-01

## 2019-01-01 RX ORDER — POTASSIUM CHLORIDE 20 MEQ/1
40 TABLET, EXTENDED RELEASE ORAL ONCE
Status: COMPLETED | OUTPATIENT
Start: 2019-01-01 | End: 2019-01-01

## 2019-01-01 RX ORDER — SPIRONOLACTONE 100 MG/1
100 TABLET, FILM COATED ORAL DAILY
Status: DISCONTINUED | OUTPATIENT
Start: 2019-01-01 | End: 2019-01-01

## 2019-01-01 RX ORDER — FUROSEMIDE 40 MG/1
40 TABLET ORAL DAILY
Qty: 30 TABLET | Refills: 1 | Status: ON HOLD | OUTPATIENT
Start: 2019-01-01 | End: 2019-01-01

## 2019-01-01 RX ORDER — LACTULOSE 20 G/30ML
20 SOLUTION ORAL
Status: ON HOLD | COMMUNITY
End: 2019-01-01

## 2019-01-01 RX ORDER — LACTULOSE 20 G/30ML
20 SOLUTION ORAL 2 TIMES DAILY
Qty: 1800 ML | Refills: 0 | Status: ON HOLD | OUTPATIENT
Start: 2019-01-01 | End: 2019-01-01

## 2019-01-01 RX ORDER — SCOLOPAMINE TRANSDERMAL SYSTEM 1 MG/1
1 PATCH, EXTENDED RELEASE TRANSDERMAL
Status: DISCONTINUED | OUTPATIENT
Start: 2019-01-01 | End: 2019-07-22

## 2019-01-01 RX ORDER — HALOPERIDOL 5 MG/ML
2 INJECTION INTRAMUSCULAR EVERY 2 HOUR PRN
Status: DISCONTINUED | OUTPATIENT
Start: 2019-01-01 | End: 2019-07-22

## 2019-01-01 RX ORDER — MORPHINE SULFATE 4 MG/ML
1 INJECTION, SOLUTION INTRAMUSCULAR; INTRAVENOUS EVERY 2 HOUR PRN
Status: DISCONTINUED | OUTPATIENT
Start: 2019-01-01 | End: 2019-01-01

## 2019-01-01 RX ORDER — LORAZEPAM 1 MG/1
2 TABLET ORAL
Status: DISCONTINUED | OUTPATIENT
Start: 2019-01-01 | End: 2019-01-01

## 2019-01-01 RX ORDER — ONDANSETRON 4 MG/1
4 TABLET, ORALLY DISINTEGRATING ORAL EVERY 6 HOURS PRN
Status: DISCONTINUED | OUTPATIENT
Start: 2019-01-01 | End: 2019-07-22

## 2019-01-01 RX ORDER — LORAZEPAM 0.5 MG/1
0.5 TABLET ORAL EVERY 4 HOURS PRN
Status: DISCONTINUED | OUTPATIENT
Start: 2019-01-01 | End: 2019-01-01

## 2019-01-01 RX ORDER — ONDANSETRON 2 MG/ML
4 INJECTION INTRAMUSCULAR; INTRAVENOUS EVERY 6 HOURS PRN
Status: DISCONTINUED | OUTPATIENT
Start: 2019-01-01 | End: 2019-01-01

## 2019-01-01 RX ORDER — ARIPIPRAZOLE 5 MG/1
2.5 TABLET ORAL 2 TIMES DAILY
Status: CANCELLED | OUTPATIENT
Start: 2019-01-01

## 2019-01-01 RX ORDER — MORPHINE SULFATE 4 MG/ML
2 INJECTION, SOLUTION INTRAMUSCULAR; INTRAVENOUS EVERY 2 HOUR PRN
Status: DISCONTINUED | OUTPATIENT
Start: 2019-01-01 | End: 2019-07-22

## 2019-01-01 RX ORDER — LORAZEPAM 2 MG/ML
1 INJECTION INTRAMUSCULAR
Status: DISCONTINUED | OUTPATIENT
Start: 2019-01-01 | End: 2019-01-01

## 2019-01-01 RX ORDER — HALOPERIDOL 5 MG/ML
2 INJECTION INTRAMUSCULAR EVERY 2 HOUR PRN
Status: DISCONTINUED | OUTPATIENT
Start: 2019-01-01 | End: 2019-01-01

## 2019-01-01 RX ORDER — LACTULOSE 10 G/15ML
20 SOLUTION ORAL 3 TIMES DAILY
Status: DISCONTINUED | OUTPATIENT
Start: 2019-01-01 | End: 2019-01-01

## 2019-01-01 RX ORDER — ALBUMIN (HUMAN) 12.5 G/50ML
25 SOLUTION INTRAVENOUS EVERY 8 HOURS
Status: COMPLETED | OUTPATIENT
Start: 2019-01-01 | End: 2019-01-01

## 2019-01-01 RX ORDER — NICOTINE 21 MG/24HR
1 PATCH, TRANSDERMAL 24 HOURS TRANSDERMAL DAILY
Status: DISCONTINUED | OUTPATIENT
Start: 2019-01-01 | End: 2019-01-01

## 2019-01-01 RX ORDER — SODIUM CHLORIDE 9 MG/ML
INJECTION, SOLUTION INTRAVENOUS ONCE
Status: COMPLETED | OUTPATIENT
Start: 2019-01-01 | End: 2019-01-01

## 2019-01-01 RX ORDER — LORAZEPAM 2 MG/ML
1 INJECTION INTRAMUSCULAR EVERY 30 MIN PRN
Status: DISCONTINUED | OUTPATIENT
Start: 2019-01-01 | End: 2019-01-01

## 2019-01-01 RX ORDER — IPRATROPIUM BROMIDE AND ALBUTEROL SULFATE 2.5; .5 MG/3ML; MG/3ML
3 SOLUTION RESPIRATORY (INHALATION) EVERY 6 HOURS PRN
Status: DISCONTINUED | OUTPATIENT
Start: 2019-01-01 | End: 2019-01-01

## 2019-01-01 RX ORDER — FUROSEMIDE 40 MG/1
40 TABLET ORAL DAILY
Status: DISCONTINUED | OUTPATIENT
Start: 2019-01-01 | End: 2019-01-01

## 2019-01-01 RX ORDER — LORAZEPAM 1 MG/1
1 TABLET ORAL EVERY 6 HOURS PRN
Status: DISCONTINUED | OUTPATIENT
Start: 2019-01-01 | End: 2019-01-01

## 2019-01-01 RX ORDER — MELATONIN
100 DAILY
Qty: 30 TABLET | Refills: 1 | Status: ON HOLD | OUTPATIENT
Start: 2019-01-01 | End: 2019-01-01

## 2019-01-01 RX ORDER — MORPHINE SULFATE 4 MG/ML
2 INJECTION, SOLUTION INTRAMUSCULAR; INTRAVENOUS EVERY 2 HOUR PRN
Status: DISCONTINUED | OUTPATIENT
Start: 2019-01-01 | End: 2019-01-01

## 2019-01-01 RX ORDER — HALOPERIDOL 5 MG/ML
2 INJECTION INTRAMUSCULAR EVERY 2 HOUR PRN
Status: CANCELLED | OUTPATIENT
Start: 2019-01-01

## 2019-01-01 RX ORDER — GABAPENTIN 300 MG/1
300 CAPSULE ORAL 2 TIMES DAILY
Status: DISCONTINUED | OUTPATIENT
Start: 2019-01-01 | End: 2019-01-01

## 2019-01-01 RX ORDER — PANTOPRAZOLE SODIUM 40 MG/1
40 TABLET, DELAYED RELEASE ORAL
Qty: 30 TABLET | Refills: 1 | Status: SHIPPED | OUTPATIENT
Start: 2019-01-01 | End: 2019-01-01

## 2019-01-01 RX ORDER — ALBUMIN (HUMAN) 12.5 G/50ML
100 SOLUTION INTRAVENOUS ONCE
Status: COMPLETED | OUTPATIENT
Start: 2019-01-01 | End: 2019-01-01

## 2019-01-01 RX ORDER — ONDANSETRON 2 MG/ML
4 INJECTION INTRAMUSCULAR; INTRAVENOUS EVERY 6 HOURS PRN
Status: DISCONTINUED | OUTPATIENT
Start: 2019-01-01 | End: 2019-07-22

## 2019-01-01 RX ORDER — LORAZEPAM 1 MG/1
1 TABLET ORAL
Status: DISCONTINUED | OUTPATIENT
Start: 2019-01-01 | End: 2019-01-01

## 2019-01-01 RX ORDER — MORPHINE SULFATE 4 MG/ML
4 INJECTION, SOLUTION INTRAMUSCULAR; INTRAVENOUS EVERY 2 HOUR PRN
Status: DISCONTINUED | OUTPATIENT
Start: 2019-01-01 | End: 2019-01-01

## 2019-01-01 RX ORDER — LORAZEPAM 1 MG/1
2 TABLET ORAL EVERY 4 HOURS PRN
Status: DISCONTINUED | OUTPATIENT
Start: 2019-01-01 | End: 2019-01-01

## 2019-01-01 RX ORDER — PROPRANOLOL HYDROCHLORIDE 10 MG/1
10 TABLET ORAL 2 TIMES DAILY
Qty: 60 TABLET | Refills: 0 | Status: CANCELLED | OUTPATIENT
Start: 2019-01-01

## 2019-01-01 RX ORDER — METOCLOPRAMIDE HYDROCHLORIDE 5 MG/ML
10 INJECTION INTRAMUSCULAR; INTRAVENOUS EVERY 8 HOURS PRN
Status: DISCONTINUED | OUTPATIENT
Start: 2019-01-01 | End: 2019-01-01

## 2019-01-01 RX ORDER — SPIRONOLACTONE 100 MG/1
100 TABLET, FILM COATED ORAL DAILY
Qty: 30 TABLET | Refills: 1 | Status: ON HOLD | OUTPATIENT
Start: 2019-01-01 | End: 2019-01-01

## 2019-01-01 RX ORDER — MULTIPLE VITAMINS W/ MINERALS TAB 9MG-400MCG
1 TAB ORAL DAILY
Status: DISCONTINUED | OUTPATIENT
Start: 2019-01-01 | End: 2019-01-01

## 2019-01-01 RX ORDER — ACETAMINOPHEN 325 MG/1
650 TABLET ORAL EVERY 6 HOURS PRN
Status: DISCONTINUED | OUTPATIENT
Start: 2019-01-01 | End: 2019-01-01

## 2019-01-01 RX ORDER — POTASSIUM CHLORIDE 20 MEQ/1
40 TABLET, EXTENDED RELEASE ORAL EVERY 4 HOURS
Status: COMPLETED | OUTPATIENT
Start: 2019-01-01 | End: 2019-01-01

## 2019-01-01 RX ORDER — KETOROLAC TROMETHAMINE 30 MG/ML
30 INJECTION, SOLUTION INTRAMUSCULAR; INTRAVENOUS EVERY 6 HOURS PRN
Status: DISPENSED | OUTPATIENT
Start: 2019-01-01 | End: 2019-01-01

## 2019-01-01 RX ORDER — BUTALBITAL, ACETAMINOPHEN AND CAFFEINE 50; 325; 40 MG/1; MG/1; MG/1
1 TABLET ORAL EVERY 4 HOURS PRN
Status: DISCONTINUED | OUTPATIENT
Start: 2019-01-01 | End: 2019-01-01

## 2019-01-01 RX ORDER — ALBUMIN (HUMAN) 12.5 G/50ML
100 SOLUTION INTRAVENOUS EVERY 12 HOURS
Status: DISCONTINUED | OUTPATIENT
Start: 2019-01-01 | End: 2019-01-01

## 2019-01-01 RX ORDER — HYDROCODONE BITARTRATE AND ACETAMINOPHEN 5; 325 MG/1; MG/1
1 TABLET ORAL EVERY 4 HOURS PRN
Status: DISCONTINUED | OUTPATIENT
Start: 2019-01-01 | End: 2019-01-01

## 2019-01-01 RX ORDER — NICOTINE 21 MG/24HR
1 PATCH, TRANSDERMAL 24 HOURS TRANSDERMAL DAILY
Qty: 30 PATCH | Refills: 1 | Status: SHIPPED | OUTPATIENT
Start: 2019-01-01 | End: 2019-01-01 | Stop reason: CLARIF

## 2019-01-01 RX ORDER — FUROSEMIDE 10 MG/ML
20 INJECTION INTRAMUSCULAR; INTRAVENOUS ONCE
Status: COMPLETED | OUTPATIENT
Start: 2019-01-01 | End: 2019-01-01

## 2019-01-01 RX ORDER — FOLIC ACID 1 MG/1
1 TABLET ORAL DAILY
Qty: 30 TABLET | Refills: 1 | Status: ON HOLD | OUTPATIENT
Start: 2019-01-01 | End: 2019-01-01

## 2019-01-01 RX ORDER — FOLIC ACID 1 MG/1
1 TABLET ORAL DAILY
Status: DISCONTINUED | OUTPATIENT
Start: 2019-01-01 | End: 2019-01-01

## 2019-01-01 RX ORDER — ALBUMIN (HUMAN) 12.5 G/50ML
25 SOLUTION INTRAVENOUS EVERY 8 HOURS
Status: DISCONTINUED | OUTPATIENT
Start: 2019-01-01 | End: 2019-01-01

## 2019-01-01 RX ORDER — MORPHINE SULFATE 1 MG/ML
2 INJECTION, SOLUTION EPIDURAL; INTRATHECAL; INTRAVENOUS
Status: DISCONTINUED | OUTPATIENT
Start: 2019-01-01 | End: 2019-01-01

## 2019-01-01 RX ORDER — LACTULOSE 20 G/30ML
30 SOLUTION ORAL ONCE
Status: COMPLETED | OUTPATIENT
Start: 2019-01-01 | End: 2019-01-01

## 2019-01-01 RX ORDER — ACETAMINOPHEN 650 MG/1
650 SUPPOSITORY RECTAL EVERY 4 HOURS PRN
Status: DISCONTINUED | OUTPATIENT
Start: 2019-01-01 | End: 2019-07-22

## 2019-01-01 RX ORDER — LACTULOSE 10 G/15ML
30 SOLUTION ORAL 3 TIMES DAILY
Status: DISCONTINUED | OUTPATIENT
Start: 2019-01-01 | End: 2019-01-01

## 2019-01-01 RX ORDER — MUSCLE RUB CREAM 100; 150 MG/G; MG/G
CREAM TOPICAL 3 TIMES DAILY PRN
Status: DISCONTINUED | OUTPATIENT
Start: 2019-01-01 | End: 2019-01-01

## 2019-01-01 RX ORDER — LACTULOSE 10 G/15ML
20 SOLUTION ORAL 4 TIMES DAILY
Status: DISCONTINUED | OUTPATIENT
Start: 2019-01-01 | End: 2019-01-01

## 2019-01-01 RX ORDER — LORAZEPAM 1 MG/1
1 TABLET ORAL EVERY 4 HOURS PRN
Status: DISCONTINUED | OUTPATIENT
Start: 2019-01-01 | End: 2019-01-01

## 2019-01-01 RX ORDER — FUROSEMIDE 10 MG/ML
40 INJECTION INTRAMUSCULAR; INTRAVENOUS DAILY PRN
Status: DISCONTINUED | OUTPATIENT
Start: 2019-01-01 | End: 2019-07-22

## 2019-01-01 RX ORDER — LACTULOSE 20 G/30ML
20 SOLUTION ORAL 2 TIMES DAILY
Qty: 1800 ML | Refills: 0 | Status: SHIPPED | OUTPATIENT
Start: 2019-01-01 | End: 2019-01-01 | Stop reason: CLARIF

## 2019-01-01 RX ORDER — FUROSEMIDE 40 MG/1
40 TABLET ORAL DAILY
Status: ON HOLD | COMMUNITY
End: 2019-01-01

## 2019-01-01 RX ORDER — LORAZEPAM 2 MG/ML
1 INJECTION INTRAMUSCULAR
Status: DISCONTINUED | OUTPATIENT
Start: 2019-01-01 | End: 2019-07-22

## 2019-01-01 RX ORDER — OLANZAPINE 10 MG/1
15 TABLET ORAL NIGHTLY
Status: DISCONTINUED | OUTPATIENT
Start: 2019-01-01 | End: 2019-01-01

## 2019-01-01 RX ORDER — FUROSEMIDE 10 MG/ML
40 INJECTION INTRAMUSCULAR; INTRAVENOUS
Status: DISCONTINUED | OUTPATIENT
Start: 2019-01-01 | End: 2019-01-01

## 2019-01-01 RX ORDER — BISACODYL 10 MG
10 SUPPOSITORY, RECTAL RECTAL
Status: DISCONTINUED | OUTPATIENT
Start: 2019-01-01 | End: 2019-07-22

## 2019-01-01 RX ORDER — SODIUM CHLORIDE 9 MG/ML
INJECTION, SOLUTION INTRAVENOUS CONTINUOUS
Status: DISCONTINUED | OUTPATIENT
Start: 2019-01-01 | End: 2019-01-01

## 2019-01-01 RX ORDER — PANTOPRAZOLE SODIUM 40 MG/1
40 TABLET, DELAYED RELEASE ORAL
Qty: 30 TABLET | Refills: 1 | Status: ON HOLD | OUTPATIENT
Start: 2019-01-01 | End: 2019-01-01

## 2019-01-01 RX ORDER — LORAZEPAM 2 MG/ML
4 INJECTION INTRAMUSCULAR EVERY 10 MIN PRN
Status: DISCONTINUED | OUTPATIENT
Start: 2019-01-01 | End: 2019-01-01

## 2019-01-01 RX ORDER — ALBUMIN (HUMAN) 12.5 G/50ML
25 SOLUTION INTRAVENOUS ONCE
Status: COMPLETED | OUTPATIENT
Start: 2019-01-01 | End: 2019-01-01

## 2019-01-01 RX ORDER — FUROSEMIDE 10 MG/ML
40 INJECTION INTRAMUSCULAR; INTRAVENOUS ONCE
Status: COMPLETED | OUTPATIENT
Start: 2019-01-01 | End: 2019-01-01

## 2019-01-01 RX ORDER — PANTOPRAZOLE SODIUM 40 MG/1
40 TABLET, DELAYED RELEASE ORAL
Qty: 60 TABLET | Refills: 0 | Status: ON HOLD | OUTPATIENT
Start: 2019-01-01 | End: 2019-01-01

## 2019-01-01 RX ORDER — ACETAMINOPHEN 160 MG/5ML
650 SOLUTION ORAL EVERY 4 HOURS PRN
Status: DISCONTINUED | OUTPATIENT
Start: 2019-01-01 | End: 2019-07-22

## 2019-01-01 RX ORDER — SPIRONOLACTONE 100 MG/1
100 TABLET, FILM COATED ORAL DAILY
Status: ON HOLD | COMMUNITY
End: 2019-01-01

## 2019-01-01 RX ORDER — LACTULOSE 20 G/30ML
20 SOLUTION ORAL 3 TIMES DAILY
Status: DISCONTINUED | OUTPATIENT
Start: 2019-01-01 | End: 2019-01-01

## 2019-01-01 RX ORDER — GLYCOPYRROLATE 0.2 MG/ML
0.4 INJECTION, SOLUTION INTRAMUSCULAR; INTRAVENOUS
Status: DISCONTINUED | OUTPATIENT
Start: 2019-01-01 | End: 2019-07-22

## 2019-01-01 RX ORDER — NITROGLYCERIN 0.4 MG/1
0.4 TABLET SUBLINGUAL EVERY 5 MIN PRN
Status: DISCONTINUED | OUTPATIENT
Start: 2019-01-01 | End: 2019-01-01

## 2019-01-01 RX ORDER — ATROPINE SULFATE 10 MG/ML
2 SOLUTION/ DROPS OPHTHALMIC EVERY 2 HOUR PRN
Status: DISCONTINUED | OUTPATIENT
Start: 2019-01-01 | End: 2019-07-22

## 2019-01-01 RX ORDER — MORPHINE SULFATE 20 MG/ML
5 SOLUTION ORAL
Status: DISCONTINUED | OUTPATIENT
Start: 2019-01-01 | End: 2019-07-22

## 2019-05-05 PROBLEM — E87.1 HYPONATREMIA: Status: ACTIVE | Noted: 2019-01-01

## 2019-05-05 PROBLEM — R07.9 CHEST PAIN OF UNCERTAIN ETIOLOGY: Status: ACTIVE | Noted: 2019-01-01

## 2019-05-05 PROBLEM — R73.9 HYPERGLYCEMIA: Status: ACTIVE | Noted: 2019-01-01

## 2019-05-05 PROBLEM — D64.9 ANEMIA: Status: ACTIVE | Noted: 2019-01-01

## 2019-05-05 PROBLEM — D69.6 THROMBOCYTOPENIA (HCC): Status: ACTIVE | Noted: 2019-01-01

## 2019-05-05 PROBLEM — R17 JAUNDICE: Status: ACTIVE | Noted: 2019-01-01

## 2019-05-05 PROBLEM — R60.1 GENERALIZED EDEMA: Status: ACTIVE | Noted: 2019-01-01

## 2019-05-05 PROBLEM — R06.02 SHORTNESS OF BREATH: Status: ACTIVE | Noted: 2019-01-01

## 2019-05-05 NOTE — ED PROVIDER NOTES
Patient Seen in: [facility] Emergency Department    History   Patient presents with:  Dyspnea LINH SOB (respiratory)  Swelling Edema (cardiovascular, metabolic)    Stated Complaint: LINH     HPI    Patient presents with shortness of breath and chest p Exam     ED Triage Vitals [05/05/19 1434]   /79   Pulse 98   Resp 22   Temp 99.3 °F (37.4 °C)   Temp src Temporal   SpO2 95 %   O2 Device None (Room air)       Current:/87   Pulse 92   Temp 99.3 °F (37.4 °C) (Temporal)   Resp 20   SpO2 95% normal limits   TROPONIN I - Normal   LIPASE - Normal   AMMONIA, PLASMA - Normal   MAGNESIUM - Normal   CBC WITH DIFFERENTIAL WITH PLATELET    Narrative: The following orders were created for panel order CBC WITH DIFFERENTIAL WITH PLATELET.   Procedure coagulopathy. The patient will be admitted to Dr. Chayito Stringer from the hospitalist service for further work-up and treatment.   Admission disposition: 5/5/2019  3:39 PM         Disposition and Plan     Clinical Impression:  Chest pain of uncertain etiology  (

## 2019-05-05 NOTE — ED INITIAL ASSESSMENT (HPI)
Pt here via EMS with c/o LINH, fluid retention, history of liver CA. Patient noted to have significant pitting edema, jaundice. Breathing labored.

## 2019-05-05 NOTE — H&P
[facility]  History and Physical     Herminio Harada Patient Status:  Emergency    1960 MRN [id number]   Location [address] [facility] Attending Paola Crump MD   Hosp Day # 0 PCP None Pcp     Chief Complaint: chest daily. Disp:  Rfl:    spironolactone 100 MG Oral Tab Take 100 mg by mouth daily. Disp:  Rfl:    RaNITidine HCl 150 MG Oral Tab Take 150 mg by mouth 2 (two) times daily. Disp:  Rfl:    gabapentin 300 MG Oral Cap Take 300 mg by mouth 2 (two) times daily.  Dis GFRAA 80   GFRNAA 69   CA 8.4*   ALB 2.4*   *   K 4.4   CL 93*   CO2 25.0   ALKPHO 105   AST 70*   ALT 24   BILT 20.4*   TP 6.6       CrCl cannot be calculated (Unknown ideal weight. ).     Recent Labs   Lab 05/05/19  1436   PTP 25.5*   INR 2.17*

## 2019-05-06 PROBLEM — R07.89 ATYPICAL CHEST PAIN: Status: ACTIVE | Noted: 2019-01-01

## 2019-05-06 NOTE — PROCEDURES
[facility]  Procedure Note    Josiane Gusman Patient Status:  Inpatient    1960 MRN [id number]   [facility] 4SW-A Attending Prisca Eli MD   Hosp Day # 1 PCP None Pcp   LOCATION: Right lower quadrant  FLUID REMOVED: 7.2 L  MED

## 2019-05-06 NOTE — PROGRESS NOTES
ICU  Critical Care APRN Progress Note    NAME: Solis Omalley - ROOM: [address] [place] [address] - MRN: [id number] - Age: 62year old - :1960    History Of Present Illness:  Solis Omalley is a 62year old male with PMHx significant for stage 4 liver cancer, etoh Heart: Regular rate and rhythm, S1 and S2 normal, no murmur, rub or gallop  Abdomen: Soft, non-tender, bowel sounds active all four quadrants, no masses, no organomegaly, Anasarca, moderate distention   Extremities: Extremities normal, atraumatic, no cyano PTT 38.8 05/05/2019    INR 2.17 05/05/2019    MG 2.1 05/05/2019       Assessment/Plan:    1. Alcohol Intoxication  - Alcohol withdrawal/CIWA order set  - Psych liaison consult  - Thiamine, Folate,  - activity as tolerated    2.  Large abdominal ascites 2/2

## 2019-05-06 NOTE — PROGRESS NOTES
NURSING ADMISSION NOTE      Patient admitted via Cart  Oriented to room. Safety precautions initiated. Bed in low position. Call light in reach. Alert & orientedx4. NSR/ST on tele; highest 110. Pt denies chest pain. PAL noted.  Oxygen saturations

## 2019-05-06 NOTE — CM/SW NOTE
Advised in ICU rounds pt currently under CIWA protocol. Pending OKSANA eval. Pt with hx stage 4 liver CA. S/P paracentesis today secondary to ascites. Pt showing as unfunded. Called to [address] to request eval.  PC consulted for GOC.   Has PHQ4 prot

## 2019-05-06 NOTE — CM/SW NOTE
Responding to order for PHQ4 and SW for ETOH / meds/ finances. Pt currently under [facility]. Medical CM/SW defer to [facility] for ETOH assessment / plan. Have asked [facility] to eval pt when he is more appropriate for conversation.     Plan:  /ca

## 2019-05-06 NOTE — PLAN OF CARE
Received patient on bed at 1930. Patient alert watching TV. Transport picked up patient for US of abdomen. When patient got back, he denied pain but can't get comfortable. Dr. Roselia King called and ordered to transfer patient to ICU.  Patient updated with POC

## 2019-05-06 NOTE — PROGRESS NOTES
[facility]  Progress Note     Jennifer Villar Patient Status:  Inpatient    1960 MRN [id number]   [facility] 4SW-A Attending José Miguel Rice MD   Hosp Day # 1 PCP None Pcp     Chief Complaint:  CP.  Swelling   S:    Legs/ abd >2  5. thrombocytopenia  1. GI following   2. Paracentesis  Later today   3. IV diuretics/ aldactone   6. ETOH abuse  1. CIWA, monitor for w/d. Has hx of DT's but no seizures  2. Minimal ativan needed but pt sleepy this am    7. Nicotine dependence  1.  Pa

## 2019-05-06 NOTE — CONSULTS
Pulmonary H&P/Consult       NAME: Luann De La Rosa - ROOM: [address] [place] [address]-[facility] MRN: [id number] - Age: 62year old - :  1960    Date of Admission: 2019  2:32 PM  Admission Diagnosis: Shortness of breath [R06.02]  Generalized edema [R60.1]  Jaundice [R17] before breakfast. Disp:  Rfl:  2/11/2018   B Complex Vitamins (VITAMIN B COMPLEX OR) Inject as directed. Disp:  Rfl:  2/11/2018   Cholecalciferol (VITAMIN D) 2000 units Oral Tab Take by mouth.  Disp:  Rfl:  2/11/2018   PEG 3350-KCl-Na Bicarb-NaCl 420 g Oral History:  No family history on file. Home Medications:    Outpatient Medications Marked as Taking for the 5/5/19 encounter [facility] HOS[facility]ITAL Encounter):  furosemide 40 MG Oral Tab Take 40 mg by mouth daily.  Disp:  Rfl:    spironolactone 100 MG Oral Tab Take 10 Encounters:  05/05/19 : 211 lb 13.8 oz (96.1 kg)  02/12/18 : 180 lb (81.6 kg)  01/23/18 : 175 lb (79.4 kg)        Intake/Output Summary (Last 24 hours) at 5/6/2019 0900  Last data filed at 5/6/2019 0600  Gross per 24 hour   Intake 656 ml   Output 540 ml last 72 hours. Invalid input(s): TROPI    INR   Date/Time Value Ref Range Status   05/06/2019 04:47 AM 2.16 (H) 0.90 - 1.10 Final     Comment:       New lot of PT reagent started on February 26,2019. The new INR reference range is 0.90-1. 10.        Album

## 2019-05-06 NOTE — PLAN OF CARE
Pt. Transferred to ICU from [facility] 87 of 15, on room air. A/O x4, forgetful at times. Generalized jaundice, abdomen distended. Lasix given for fluid retention. Ativan given per CIWA protocol. PRN morphine given for pain.   Complaints of chest pain in output  - Evaluate effectiveness of vasoactive medications to optimize hemodynamic stability  - Monitor arterial and/or venous puncture sites for bleeding and/or hematoma  - Assess quality of pulses, skin color and temperature  - Assess for signs of decrea

## 2019-05-06 NOTE — DIETARY NOTE
[address] [facility]     Admitting diagnosis:  Shortness of breath [R06.02]  Generalized edema [R60.1]  Jaundice [R17]  Chest pain of uncertain etiology [H70.27]    Ht:    Wt: 96.1 kg (211 lb 13.8 oz).    Body mass index

## 2019-05-06 NOTE — ED NOTES
Spoke with pt nurse, Danyell James [facility]. Pt still in ICU and is having procedure today and pt likely expected to be in hospital at least next two days. Writer informs RN psych liaison will assess pt closer to pt's d/c date and will check-in tomorrow.

## 2019-05-06 NOTE — CONSULTS
[facility]                       Gastroenterology Consultation-[facility]    Rae Chance Patient Status:  Inpatient    1960 MRN [id number]   [facility] 4SW-A Attending Aung Bui MD   [address] Day # 1 PCP None P (EGD) N/A 2/13/2018    Performed by Micki Solis MD at [address] [place] [address] ENDOSCOPY     Medications:   [COMPLETED] furosemide (LASIX) injection 40 mg 40 mg Intravenous Once   gabapentin (NEURONTIN) cap 300 mg 300 mg Oral BID   Pantoprazole Sodium (PROTONIX) EC tab Cardiovascular: No history of CAD, prior MI, chest pain, or palpitations            Respiratory: No shortness of breath, asthma, copd, recurrent pneumonia            Hematologic: The patient reports no easy bruising, frequent gum bleeding or nose bl CREATSERUM 1.41 05/06/2019    BUN 13 05/06/2019     05/06/2019    K 4.1 05/06/2019    CL 91 05/06/2019    CO2 26.0 05/06/2019     05/06/2019    CA 8.6 05/06/2019    ALB 2.4 05/06/2019    ALKPHO 112 05/06/2019    BILT 19.8 05/06/2019    AST 64 vasculature are unremarkable. Minimal left basilar atelectasis. No definite consolidation. No pneumothorax. IMPRESSION:  No definite consolidation.       Dictated by: Prince Harding MD on 5/05/2019 at 15:12       Approved by: Prince Harding MD MERI with diuretics. Active drinker. Poor prognosis. Rest of management per APN note.        Rumaldo Favre  Gastroenterology/Advanced [address] [facility]enter[facility]

## 2019-05-06 NOTE — PROGRESS NOTES
05/06/19 0229   Clinical Encounter Type   Visited With Patient   Patient's Supportive Strategies/Resources Father Netta Amanda provided prayer, Scripture, support and Sacrament of the Sick.     Sacramental Encounters   Sacrament of Sick-Anointing Anointed

## 2019-05-06 NOTE — CONSULTS
[facility]  Cardiology Consultation    Drew Alvarado Patient Status:  Inpatient    1960 MRN [id number]   [facility] 4SW-A Attending Kaveh Middleton MD   Hosp Day # 1 PCP None Pcp     Reason for Consultation:  Chest pain (ALDACTONE) tab 100 mg, 100 mg, Oral, Daily  •  nitroGLYCERIN (NITROSTAT) SL tab 0.4 mg, 0.4 mg, Sublingual, Q5 Min PRN  •  morphINE sulfate (PF) 4 MG/ML injection 1 mg, 1 mg, Intravenous, Q2H PRN **OR** morphINE sulfate (PF) 4 MG/ML injection 2 mg, 2 mg, bruits. Cardiac: Regular rate and rhythm. S1, S2 normal. No murmur, pericardial rub, S3.  Lungs: Clear without wheezes, rales, rhonchi or dullness. Normal excursions and effort. Abdomen: Soft, non-tender.   Extremities: Without clubbing, cyanosis or neema concerns. Thank you for allowing me to participate in the care of your patient.     Kirstin Pierson MD  5/6/2019  12:38 PM

## 2019-05-06 NOTE — PLAN OF CARE
Spoke with [facility], pt goal INR should be <3.0 for para, current INR 2.16, vitamin K already infusing. Per RN, no need for repeat INR post vladmiir K. Pt to US for para just before 3pm, back to room ~ 1700. Per US dept 7.2 L removed.

## 2019-05-07 PROBLEM — Z71.89 GOALS OF CARE, COUNSELING/DISCUSSION: Status: ACTIVE | Noted: 2019-01-01

## 2019-05-07 PROBLEM — Z51.5 PALLIATIVE CARE ENCOUNTER: Status: ACTIVE | Noted: 2019-01-01

## 2019-05-07 NOTE — PROGRESS NOTES
[facility]  Progress Note     Obi Hippo Patient Status:  Inpatient    1960 MRN [id number]   [facility] 4SW-A Attending Genna Godoy MD   Hosp Day # 2 PCP None Pcp     Chief Complaint:  CP.  Swelling   S:   Breathing be PLAN:   1. Chest pain  Trop neg x2   1. cards  Signed off Not ACS   2. EKG no ischemic changes  3. Echo   p EF No WMA   2. Anasarca 2/2 etoh cirrhosis  7.2 liters removed 5/6  Lasix BID / sofi - monitor labs,   3.  Elevated total bilirubin  Lower today

## 2019-05-07 NOTE — PLAN OF CARE
Problem: Patient/Family Goals  Goal: Patient/Family Long Term Goal  Description  Patient's Long Term Goal: \"To go home\"    Interventions:  - manage withdrawal s/s  Psych consult  Echo   Reduce fluid retention     - See additional Care Plan goals for sp RESPIRATORY - ADULT  Goal: Achieves optimal ventilation and oxygenation  Description  INTERVENTIONS:  - Assess for changes in respiratory status  - Assess for changes in mentation and behavior  - Position to facilitate oxygenation and minimize respiratory

## 2019-05-07 NOTE — BH PROGRESS NOTE
Went to see the pt for his etoh history. The pt admits to drinking vodka daily. He said, he goes through a handle of vodka weekly. He states this is less and says he would like to stop completely but don't know if he has the will power.   We discussed hi

## 2019-05-07 NOTE — HOME CARE LIAISON
[facility] unable to accept patient at this time for elias visits. Left  for [place], [facility].

## 2019-05-07 NOTE — PROGRESS NOTES
[facility] Pulm/CC  - chart reviewed. Pt transferred out of ICU w/o further incidence. No additional pulm/cc issues. Will sign off. - please call with questions.     Kelsey Arce MD

## 2019-05-07 NOTE — PHYSICAL THERAPY NOTE
PHYSICAL THERAPY EVALUATION - INPATIENT     Room Number: 6707/7735-T  Evaluation Date: 5/7/2019  Type of Evaluation: Initial  Physician Order: PT Eval and Treat    Presenting Problem: ETOH cirrhosis; Jaundice; ascites  Reason for Therapy: Mobility Dysf Yes  Stairs to Bedroom: 0       Lives With: Other (Comment)(friend)  Drives: No  Patient Owned Equipment: None  Patient Regularly Uses: Glasses    Prior Level of [place]: Pt reports amb without AD and indep in ADL's.  Reports a few falls, mainly in leandro to press to initiate the call with pt following. Pt then looking for number again, and provided him with it, written in front of him. Pt unable to adequately dial the correct number and unaware of error. Pt then encouraged to hand up which took a minute.  Lindsay Cast room?: A Lot   -   Climbing 3-5 steps with a railing?: Total       AM-PAC Score:  Raw Score: 12   Approx Degree of Impairment: 68.66%   Standardized Score (AM-PAC Scale): 35.33   CMS Modifier (G-Code): CL    FUNCTIONAL ABILITY STATUS  Gait Assessment   Kamron Rose training    Patient End of Session: Up in chair; With [facility] staff;Needs met;Call light within reach;RN aware of session/findings; All patient questions and concerns addressed(OKSANA liason present)    Eval completed.     ASSESSMENT     Patient is a 62year old male 150 feet with assist device: walker - rolling at assistance level: minimum assistance     Goal #4    Goal #5    Goal #6    Goal Comments: Goals established on 5/7/2019

## 2019-05-07 NOTE — PROGRESS NOTES
Gastroenterology Follow-Up Note      Rambo Gomez Patient Status:  Inpatient    1960 MRN [id number]   [facility] 3NE-A Attending Walter Cardona MD   Hosp Day # 2 PCP None Pcp     Chief Complaint/Reason for Follow Up: 62 year-o Cirrhosis  -active drinker, not transplant candidate  -agree with goals of care discussion      A.  3 Sujit Belcher  Gastroenterology/[facility] [address] [facility]enterology

## 2019-05-07 NOTE — CM/SW NOTE
S[facility] [address] called-for Elva Visit RN /PT visit    MSW met with patient and ex wife at bedside. Pt has no other family/friends who can help. Discussed TREVOR, Ex wife wants to take him home with [facility]. MSW reviewed VNA List, Access [place].     [facility] Paged

## 2019-05-07 NOTE — CM/SW NOTE
Per chart patient does not qualify for medicaid. WYATT and SAJAN and YUSRA discussed case. CM will look into cheaper medications. [place] to provide steps for patient to apply for medicare since he gets SSDI. Also will provided info on [facility].

## 2019-05-07 NOTE — CONSULTS
[address] [facility]  [id number]  Hospital Day #2  Date of Consult: 05/07/19  Patient seen at: [facility]    Reason for Consultation:      Consult requested by Dr. Philipp Garcia for evaluation of pa , became tearful when talking about his ex-wife who was killed in an auto accident almost 19 years ago. Children: none, states he has 2 brothers and 3 sisters Ziyad Patty are nosey and think they know it all\".  States they don't know he is in the [employer] Intravenous Q10 Min PRN       Labs/ imaging     Hematology:  Lab Results   Component Value Date    WBC 8.4 05/07/2019    HGB 8.5 (L) 05/07/2019    HCT 23.8 (L) 05/07/2019    PLT 67.0 (L) 05/07/2019       Coags:  Lab Results   Component Value Date    INR 2. Approved by: Marco A Baron MD                Objective/Physical Exam:     Vital Signs: BP 99/58 (BP Location: Right arm)   Pulse 93   Temp 99.4 °F (37.4 °C) (Temporal)   Resp 15   Wt 194 lb 7.1 oz (88.2 kg)   SpO2 94%   BMI 30.45 kg/m²     General: aw with Bell Vazquez alone initially. I introduced palliative care and reason for consultation.  I discussed the benefits of palliative care to include assistance with arising symptom management needs, extra layer of support, facilitate GOC/Advanced Care Planning disc review the document before signing. I explained the document in detail and that one of the nursing staff could serve as witness to his signature. I will follow up with them tomorrow.        Nicol Hernández became tearful at times when discussing Jose Francisco's serious condit available. 2. CODE STATUS: FULL CODE  3. Healthcare POA: Yu Garcia is reviewing document with his friend Jacki Jain. He would like Jacki Jain to be his HCPOA and she is agreeable. Will follow up tomorrow.      A total of 60 minutes were spent on this consult; >50% was s

## 2019-05-07 NOTE — PLAN OF CARE
Problem: Patient/Family Goals  Goal: Patient/Family Long Term Goal  Description  Patient's Long Term Goal: \"To go home\"    Interventions:  - manage withdrawal s/s  Psych consult  Echo   Reduce fluid retention     - See additional Care Plan goals for sp ADULT  Goal: Achieves optimal ventilation and oxygenation  Description  INTERVENTIONS:  - Assess for changes in respiratory status  - Assess for changes in mentation and behavior  - Position to facilitate oxygenation and minimize respiratory effort  - Oxyg

## 2019-05-08 NOTE — OCCUPATIONAL THERAPY NOTE
OCCUPATIONAL THERAPY EVALUATION - INPATIENT     Room Number: 8353/3258-B  Evaluation Date: 5/8/2019  Type of Evaluation: Initial  Presenting Problem: ETOH cirrhosis    Physician Order: IP Consult to Occupational Therapy  Reason for Therapy: ADL/IADL Dysfun does report recent falls while in shower. SUBJECTIVE   \"I'm ready to go. \"    Patient self-stated goal is to go home.      OBJECTIVE  Precautions: None  Fall Risk: High fall risk    WEIGHT BEARING RESTRICTION  Weight Bearing Restriction: None (G-Code): CJ    FUNCTIONAL TRANSFER ASSESSMENT  Supine to Sit : Supervision  Sit to Stand: Contact guard assist    Skilled Therapy Provided: Patient educated on OT role, goals, plan of care, recommendations and safety.   Patient transferred on/off chair, EO min to mod modifications of tasks    Clinical Decision Making MODERATE - Analysis of occupational profile, detailed assessments, several treatment options    Overall Complexity MODERATE     OT Discharge Recommendations: Home;24 hour care/supervision  OT Tegan Mart

## 2019-05-08 NOTE — PROGRESS NOTES
Gastroenterology Follow-Up Note      Juan Lynch Patient Status:  Inpatient    1960 MRN [id number]   [facility] 3NE-A Attending Felicia Braden MD   Hosp Day # 3 PCP None Pcp     Chief Complaint/Reason for Follow Up: 62 year-o daily    Decompensated Etoh Cirrhosis  -active drinker, not transplant candidate  -agree with ongoing goals of care discussion pending clinical course  -physical therapy      A.  3 Sujit Belcher  Gastroenterology/Advanced [facility]

## 2019-05-08 NOTE — PROGRESS NOTES
[address] Morin Follow Up    Mary Guardado  [id number]  Patient seen at: [facility]    Subjective:      I'm tired, I didn't sleep much last night. I walked around the nurses station.      Patient seen and evaluated, family 05/08/2019    BUN 18 05/08/2019     (L) 05/08/2019    K 4.2 05/08/2019    CL 91 (L) 05/08/2019    CO2 28.0 05/08/2019     (H) 05/08/2019    CA 8.5 05/08/2019    ALB 1.8 (L) 05/07/2019    ALKPHO 90 05/07/2019    BILT 14.1 (H) 05/07/2019    TP 5 30 Bedbound Extensive Disease  Can’t do any work Max Assist  Total Care Reduced Drowsy/confused   20 Bedbound Extensive Disease  Can’t do any work Max Assist  Total Care Minimal Drowsy/confused   10 Bedbound/coma Extensive Disease  Can’t do any work  com agreeable. Will re-visit the discussion tomorrow. Healthcare Agent Appointed: No      Disposition: Home at this point. Ongoing goals of care discussions limited by lack of insurance and financial resources.  Appreciate CM assistance to provide patient

## 2019-05-08 NOTE — PROGRESS NOTES
[facility]  Progress Note     Rambo Gomez Patient Status:  Inpatient    1960 MRN [id number]   [facility] 4SW-A Attending Walter Cardona MD   Hosp Day # 3 PCP None Pcp     Chief Complaint:  CP.  Swelling   S:  More oriented 3.06*     Recent Labs   Lab 05/05/19  1436 05/05/19  1834 05/05/19  2244   TROP <0.045 <0.045 <0.045        Imaging: Imaging data reviewed in Epic. ASSESSMENT / PLAN:   1. Chest pain  Trop neg x2   1. cards  Signed off Not ACS   2.  EKG no ischemic change Location: Right arm)   Pulse 93   Temp 98.1 °F (36.7 °C) (Oral)   Resp 18   Wt 194 lb 7.1 oz (88.2 kg)   SpO2 98%   BMI 30.45 kg/m²   Gen: NAD  CV: RRR, anasarca  Lungs: CTAB  GI: soft,more distended today  Skin: jaundiced    Bebe Magdaleno MD

## 2019-05-08 NOTE — PHYSICAL THERAPY NOTE
PHYSICAL THERAPY TREATMENT NOTE - INPATIENT    Room Number: 4683/3406-L     Session: 1   Number of Visits to Meet Established Goals: 6    Presenting Problem: ETOH cirrhosis; Jaundice; ascites     History related to current admission:      Pt is 62 year ol Standing: Poor -    ACTIVITY TOLERANCE                         O2 WALK                    AM-PAC '6-Clicks' INPATIENT SHORT FORM - BASIC MOBILITY  How much difficulty does the patient currently have. ..  -   Turning over in bed (including adjusting bedcloth endurance, dec strength and dec safety awareness. Pt will benefit from ongoing skilled PT.   DISCHARGE RECOMMENDATIONS  PT Discharge Recommendations: Sub-acute rehabilitation     PLAN  PT Treatment Plan: Bed mobility; Body mechanics; Endurance; Patient Stephanie Abreu

## 2019-05-08 NOTE — PLAN OF CARE
Patient is A&Ox4  Denies any pain or discomfort; no c/o SOB   No n/v/d  Afebrile, VSS  NSR on tele; on room air  Lactulose held tonight as per patient he already got 4 BMs  RLQ dressing changed with gauze and tegaderm - draining yellow output  Ascites note stability  - Monitor arterial and/or venous puncture sites for bleeding and/or hematoma  - Assess quality of pulses, skin color and temperature  - Assess for signs of decreased coronary artery perfusion - ex.  Angina  - Evaluate fluid balance, assess for ed

## 2019-05-08 NOTE — DIETARY NOTE
[facility]    NUTRITION INITIAL ASSESSMENT    Pt does not meet malnutrition criteria. NUTRITION DIAGNOSIS/PROBLEM:    Inadequate oral intake related to physiological causes as evidenced by extensive liver disease    NUTRITION INTERVENTION:       1. patient nutrition prescription  2. At least 75% intake of oral supplements  3. No signs of skin breakdown  4.  Maintain lean body mass    MEDICATIONS:  FA, Lasix, Lactulose, MVI, thiamine, Protonix    LABS:      Pt is at moderate nutrition risk    FOL

## 2019-05-09 NOTE — PROGRESS NOTES
1808 Anthony Morin Follow Up    Lori Cody  [id number]  Patient seen at: [facility]    Subjective:      I'm hungry but can't eat before the test today. Patient seen and evaluated, no family at bedside.      Review of S 129 (L) 05/09/2019    K 3.9 05/09/2019    CL 93 (L) 05/09/2019    CO2 27.0 05/09/2019     (H) 05/09/2019    CA 8.7 05/09/2019    ALB 3.3 (L) 05/09/2019    ALKPHO 71 05/09/2019    BILT 12.4 (H) 05/09/2019    TP 5.6 (L) 05/09/2019    AST 38 (H) 05/09/ Extensive Disease  Can’t do any work Max Assist  Total Care Minimal Drowsy/confused   10 Bedbound/coma Extensive Disease  Can’t do any work  coma  Max Assist  Total Care Mouth care Drowsy or coma   0 Death       Psychosocial: Emotional support provided [facility])      Palliative Care Recommendations/Plan:     1. Goals of care: ongoing goals of care discussions, await [facility] meeting with [facility], hopefully can be arranged with patient and [facility] tomorrow.  Repeat thoracentesis planned for tobib

## 2019-05-09 NOTE — PLAN OF CARE
Pt is a/o x4. Forgetful at times. Room air. NSR on tele.  VSS  Plan for US guided paracentesis this afternoon   Denies pain   IV albumin scheduled   POA paperwork signed   Jaundice sclera and skin   Hospice consulted for informational meeting tomorrow  Will respiratory status  - Assess for changes in mentation and behavior  - Position to facilitate oxygenation and minimize respiratory effort  - Oxygen supplementation based on oxygen saturation or ABGs  - Provide Smoking Cessation handout, if applicable  - Enc

## 2019-05-09 NOTE — PROCEDURES
[facility]  Procedure Note    Gib Brim Patient Status:  Inpatient    1960 MRN [id number]   [facility] 3NE-A Attending Landy Sevilla MD   Hosp Day # 4 PCP None Pcp     Procedure: US paracentesis    Pre-Procedure Diagnosi

## 2019-05-09 NOTE — PROGRESS NOTES
[facility]  Progress Note     Wava Kvng Patient Status:  Inpatient    1960 MRN [id number]   [facility] 4SW-A Attending Lachelle Hammonds MD   Hosp Day # 4 PCP None Pcp     Chief Complaint:  CP.  Swelling     S:  Pt without 05/08/19  0707 05/09/19  0624   PTP 28.2* 33.8* 31.7*   INR 2.45* 3.06* 2.83*     Recent Labs   Lab 05/05/19  1436 05/05/19  1834 05/05/19  2244   TROP <0.045 <0.045 <0.045        Imaging: Imaging data reviewed in Epic. ASSESSMENT / PLAN:   1.  Chest pain

## 2019-05-09 NOTE — CM/SW NOTE
1:46pm  MSW sent text page to [facility].    2:01pm. MSW spoke to Hospice, asked for update if they can accept. MSW await call back.

## 2019-05-09 NOTE — PROGRESS NOTES
Gastroenterology Follow-Up Note      Mina Brooks Patient Status:  Inpatient    1960 MRN [id number]   [facility] 3NE-A Attending Zelalem Patiño MD   Hosp Day # 4 PCP None Pcp     Chief Complaint/Reason for Follow Up: 62 year-o Cirrhosis  -active drinker, not transplant candidate  -agree with ongoing goals of care discussion pending clinical course  -physical therapy      ROHAN Quiroga  Gastroenterology/[facility] Gastroenterology

## 2019-05-09 NOTE — PLAN OF CARE
Patient A&O x4 but forgetful, lungs diminished, no c/o pain, but feels pressure as fluid builds up again. Patient's first paracentesis charmaine continues to drain, dressing changed overnight.

## 2019-05-10 NOTE — PHYSICAL THERAPY NOTE
PHYSICAL THERAPY TREATMENT NOTE - INPATIENT    Room Number: 0027/9047-G     Session: 2  Number of Visits to Meet Established Goals: 6    Presenting Problem: ETOH cirrhosis; Jaundice; ascites     History related to current admission:      Pt is 62year old Static Standing: Fair  Dynamic Standing: Fair -    ACTIVITY TOLERANCE                         O2 WALK  SPO2 on Room Air at Rest: 98                 AM-PAC '6-Clicks' INPATIENT SHORT FORM - BASIC MOBILITY  How much difficulty does the patient currently ha progressing well with functional mobility and independence. Pt demonstrating deficits in static and dynamic standing balance, decreased endurance, decreased safety. Pt would benefit from continued IP physical therapy to improve deficits.  Recommend home with

## 2019-05-10 NOTE — PLAN OF CARE
Problem: Patient/Family Goals  Goal: Patient/Family Long Term Goal  Description  Patient's Long Term Goal: \"To go home\"    Interventions:  - manage withdrawal s/s  Psych consult  Echo   Reduce fluid retention     - See additional Care Plan goals for sp ADULT  Goal: Achieves optimal ventilation and oxygenation  Description  INTERVENTIONS:  - Assess for changes in respiratory status  - Assess for changes in mentation and behavior  - Position to facilitate oxygenation and minimize respiratory effort  - Oxyg edema. Abdomen distended, denies sob. Clear lungs. SR/ST on tele. SCD applied. Up in halls x 3 without walker. Denies any pain. Saline locked. [facility] to met with S[facility] and [place] today at 1pm, declines hospice at this time.  Call light within reach

## 2019-05-10 NOTE — PROGRESS NOTES
Gastroenterology Follow-Up Note      Stephen Wilde Patient Status:  Inpatient    1960 MRN [id number]   [facility] 3NE-A Attending Tedd Lesch, MD   Hosp Day # 5 PCP None Pcp     Chief Complaint/Reason for Follow Up: 62 year-o Encephalopathy  -resolved  -lactulose 20g tid, titrate to 3-4 bm's daily    Anemia  -stable  -check iron studies    Decompensated Etoh Cirrhosis  -active drinker, not transplant candidate  -agree with ongoing goals of care discussion pending clinical cours

## 2019-05-10 NOTE — PROGRESS NOTES
1808 Anthony Morin Follow Up    Diana Toro  [id number]  Patient seen at: [facility]    Subjective:      \"I walked around the unit today with and without the walker\"    Patient seen and evaluated, friend Santosh Eason at bedside CO2 29.0 05/10/2019     (H) 05/10/2019    CA 9.4 05/10/2019    ALB 3.8 05/10/2019    ALKPHO 69 05/10/2019    BILT 11.5 (H) 05/10/2019    TP 6.1 (L) 05/10/2019    AST 34 05/10/2019    ALT 17 05/10/2019    MG 2.1 05/06/2019    TROP <0.045 05/05/2019 Drowsy/confused   10 Bedbound/coma Extensive Disease  Can’t do any work  coma  Max Assist  Total Care Mouth care Drowsy or coma   0 Death       Psychosocial: Emotional support provided to patient and friend.  Bg Neighbor becomes tearful when discussing patient's hospice. If hospice is not an option/choice, he will go home to follow up with [facility]. 2. CODE STATUS: FULL CODE  3.  HCPOA: document scanned into EMR, friend Chucho Love is HCPOA    A total of 15 minutes were spent on this visit; >50% was spent counseling and

## 2019-05-10 NOTE — PLAN OF CARE
Received asleep in bed. Arousable. AOx4. Jaundice. +2 to 3 LE edema. Abdomen distended, dressing intact fr post paracentesis. Denies sob. Clear lungs. SR/ST on tele. SCD applied. Up to the bathroom with walker. Denies any pain. Saline locked.  [facility] optimize hemodynamic stability  - Monitor arterial and/or venous puncture sites for bleeding and/or hematoma  - Assess quality of pulses, skin color and temperature  - Assess for signs of decreased coronary artery perfusion - ex.  Angina  - Evaluate fluid b ADULT  Goal: Skin integrity remains intact  Description  INTERVENTIONS  - Assess and document risk factors for pressure ulcer development  - Assess and document skin integrity  - Monitor for areas of redness and/or skin breakdown  - Initiate interventions,

## 2019-05-11 NOTE — PLAN OF CARE
Assumed pt care this morning. Aa/ox4. Breathing unlabored. Denies pain. Tolerating po intake. Up with standby assist and walker.

## 2019-05-11 NOTE — PLAN OF CARE
Assumed care of pt around 1600. Pt denies pain, SOB.  VSS. Afebrile. Pt ambulating with walker in hallway. Lactulose. IV lasix. Seizure precautions in place, no seizure activity noted. Will monitor closely.     0 - ID consulted

## 2019-05-11 NOTE — PROGRESS NOTES
[facility]  Progress Note     Drew Alvarado Patient Status:  Inpatient    1960 MRN [id number]   [facility] 4SW-A Attending Kaveh Middleton MD   Hosp Day # 6 PCP None Pcp     Chief Complaint:  CP.  Swelling     S:  Pt without 05/09/19  0624 05/10/19  0628   PTP 33.8* 31.7* 32.1*   INR 3.06* 2.83* 2.88*     Recent Labs   Lab 05/05/19  1436 05/05/19  1834 05/05/19  2244   TROP <0.045 <0.045 <0.045        Imaging: Imaging data reviewed in Epic. ASSESSMENT / PLAN:   1.  Chest pain

## 2019-05-11 NOTE — PLAN OF CARE
Received patient awake in bed. AOX4. No tremors. Jaundice. +1 LE edema, abdomen distended. Denies sob, clear lungs. SR on tele, tachy when up. Steady on his feet. Voids to the bathroom. On lasix and lactulose. No BM the whole day. PT works with him.  On Cef cardiac output  - Evaluate effectiveness of vasoactive medications to optimize hemodynamic stability  - Monitor arterial and/or venous puncture sites for bleeding and/or hematoma  - Assess quality of pulses, skin color and temperature  - Assess for signs o Progressing     Problem: SKIN/TISSUE INTEGRITY - ADULT  Goal: Skin integrity remains intact  Description  INTERVENTIONS  - Assess and document risk factors for pressure ulcer development  - Assess and document skin integrity  - Monitor for areas of redness

## 2019-05-11 NOTE — PROGRESS NOTES
[facility]  Progress Note    Eh Bhakta Patient Status:  Inpatient    1960 MRN [id number]   [facility] 3NE-A Attending Virginia Márquez MD   Date 2019 PCP None Pcp     Subjective:  Eh Bhakta is a(n) 62year old encounter     Goals of care, counseling/discussion     Hepatic encephalopathy (HCC)     SBP (spontaneous bacterial peritonitis) (HCC)    Impression:    Cirrhosis etoh induced  Etoh hepatitis, St elevation resolved, T bili imporving  Hyponatremia chronic  L

## 2019-05-12 NOTE — PLAN OF CARE
Assumed care 0730  Pt denies pain, SOB.  VSS. Afebrile. Pt ambulates with walker  Complaints of Right Thigh muscle pain- PRN cream given per MAR  Lactulose. IV lasix.   ID consulted Virgilio ordered- to be dosed by pharmacy   Seizure precautions in place, no appropriate  Outcome: Progressing

## 2019-05-12 NOTE — PLAN OF CARE
5/11 2000: Pt received AOx4. Room air. SR. Saline lock. Denies pain. 2300: Vancomycin IV infusing as ordered. 5/12 0200: Pt sleeping comfortably.       Problem: Impaired Functional Mobility  Goal: Achieve highest/safest level of mobility/gait  Descripti

## 2019-05-12 NOTE — PHYSICAL THERAPY NOTE
PHYSICAL THERAPY TREATMENT NOTE - INPATIENT    Room Number: 2072/2951-I     Session: 3   Number of Visits to Meet Established Goals: 6    Presenting Problem: ETOH cirrhosis; Jaundice; ascites    History related to current admission:      Pt is 64 year old Sitting: Fair +  Dynamic Sitting: Fair -           Static Standing: Fair  Dynamic Standing: Fair -    ACTIVITY TOLERANCE                         O2 WALK                    AM-PAC '6-Clicks' INPATIENT SHORT FORM - BASIC MOBILITY  How much difficulty does th correct with min asst; cues for slowed pace     Comments related to Mobility: Cues for safety required throughout session; pt with fair carryover.     Seated therapeutic exercises completed for BLE strengthening and activity tolerance with minimal cues for rolling at assistance level: minimum assistance  Met   Updated to amb 150' with Rw and supervision.    met for no AD 5/10 (not met on 5/12)   Goal #4  Pt will negotiate 7 stairs, supervision. Goal #5  Pt will be independent with HEP.     Goal #6  Pt will

## 2019-05-12 NOTE — PHYSICAL THERAPY NOTE
Attempted to see patient for PT treatment. Session approved by RN. Pt is occupied talking on phone, request PT return at a later time. Will follow-up as time allows. RN notified.

## 2019-05-12 NOTE — CONSULTS
INFECTIOUS DISEASE CONSULT NOTE    Gib Brchina Patient Status:  Inpatient    1960 MRN [id number]   [facility] 3NE-A Attending Landy Sevilla MD   Hosp Day # 7 PCP None Pcp muscle rub (THERA-GESIC) 10-15% cream, , Topical, TID PRN  •  CefTRIAXone Sodium (ROCEPHIN) 1 g in sodium chloride 0.9% 100 mL MBP/ADD-vantage, 1 g, Intravenous, Q24H  •  lactulose (ENULOSE) solution 20 g, 20 g, Oral, BID  •  furosemide (LASIX) tab 40 mg, oz (77.2 kg), SpO2 91 %. General: Alert, oriented, NAD,  HEENT: Moist mucous membranes. EOMI. No oral lesions, mild icteris  Neck: No lymphadenopathy. Supple. Respiratory: Clear to auscultation bilaterally. No wheezes. No rhonchi.   Cardiovascular: RR recs.    Problem List:  - ? SBP   - CoNS is not typical of SBP, usually expect GN organisms   - Cell counts from both paracentesis' not consistent with bacterial peritonitis   - Clx from 5/6 negative, however clx from 5/9 positive for CoNS   - Given lack of

## 2019-05-12 NOTE — PROGRESS NOTES
[facility]  Progress Note     Luana Simmons Patient Status:  Inpatient    1960 MRN [id number]   [facility] 4SW-A Attending Sj Day MD   Hosp Day # 7 PCP None Pcp     Chief Complaint:  CP.  Swelling     S:  Pt without 05/09/19  0624 05/10/19  0628   PTP 33.8* 31.7* 32.1*   INR 3.06* 2.83* 2.88*     Recent Labs   Lab 05/05/19  1436 05/05/19  1834 05/05/19  2244   TROP <0.045 <0.045 <0.045        Imaging: Imaging data reviewed in Epic. ASSESSMENT / PLAN:   1.  Chest pain

## 2019-05-12 NOTE — PROGRESS NOTES
[facility]  Progress Note    Aamir Benton Patient Status:  Inpatient    1960 MRN [id number]   [facility] 3NE-A Attending Barron Wilson MD   Date 2019 PCP None Pcp     Subjective:  Aamir Benton is a(n) 62year old bili imporving  Hyponatremia chronic  Low iron normal % sat  Coagulopathy  Anemia macrocytic, normal B12 levels  Thrombocytopenia  Ascites normal cell count, positive culture for Staph no aureus, cytology negative, no albumin level available.   [address]ell [address]

## 2019-05-13 NOTE — PROGRESS NOTES
[facility]  Progress Note     Obi Hippo Patient Status:  Inpatient    1960 MRN [id number]   [facility] 4SW-A Attending Genna Godoy MD   Hosp Day # 8 PCP None Pcp     Chief Complaint:  CP.  Swelling     S:  Pt without mg/dL). Recent Labs   Lab 05/09/19  0624 05/10/19  0628 05/13/19  0923   PTP 31.7* 32.1* 28.3*   INR 2.83* 2.88* 2.46*     No results for input(s): TROP, CK in the last 168 hours. Imaging: Imaging data reviewed in Epic. ASSESSMENT / PLAN:   1.  Chest

## 2019-05-13 NOTE — CM/SW NOTE
sw spoke with nurse who stated that pt has verbalized that he does not want hospice. Called reed harris to see if she had any questions. Left contact number.

## 2019-05-13 NOTE — CM/SW NOTE
MSW met with patient and RN at bedside. Pt wants to go home and talk to his family \"in privcay\" of my home and then decide.  MSW updated Rn that forms on chart for Medicaid need to be completed by MD. MSW will page MD.    MD Maureen Merchant page 11:57am

## 2019-05-13 NOTE — PHYSICAL THERAPY NOTE
Attempted to see pt for physical therapy treatment. Pt was sleeping, but easily arousable. Pt reports he was, \"up all night\" and remains \"tired. \"  Pt declined to participate in therapy at this time, but reported he has HEP and he will ambulate with n

## 2019-05-13 NOTE — CM/SW NOTE
Interdisciplinary Rounds: 05/13/19  Admitted: 5/5/2019 LOS: 8  Disciplines in attendance: staff nurse, CM, [address] and discharge plan reviewed.     Outcome/issues identified:   etoh cirrhosis w/ anasarca, paracentesis x2. +cs from fluid, = contaminan

## 2019-05-13 NOTE — CM/SW NOTE
10:00am  JENNY spoke to Jennifer Sender from [facility], he states that patients MD needs complete a medical necessity form, he will fax to MSW. Then [facility] can review to see if patient can apply to qualify for spend down/medicaid.

## 2019-05-13 NOTE — PROGRESS NOTES
Spoke with pt's girlfriend, stated that they will return home with no services at this time. Will call hospice when they need more assistance.

## 2019-05-13 NOTE — PLAN OF CARE
Problem: PAIN - ADULT  Goal: Verbalizes/displays adequate comfort level or patient's stated pain goal  Description  INTERVENTIONS:  - Encourage pt to monitor pain and request assistance  - Assess pain using appropriate pain scale  - Administer analgesics needed  Outcome: Progressing     Problem: DEATH & DYING  Goal: Pt/Family communicate acceptance of impending death and feel psychological comfort and peace  Description  INTERVENTIONS:  - Assess patient/family anxiety and grief process related to end of li VSS. Room air. Tele, NSR.   A&Ox4. Denies pain. Denies SOB. Jaundice and ascites noted. R thigh muscle pain, prn cream see MAR. Lactulose. IV Vanco. Dosed by pharmacy. Seizure precautions. Up with stand by and walker to bathroom, patient steady.

## 2019-05-13 NOTE — DIETARY NOTE
[facility]    NUTRITION ASSESSMENT    Pt does not meet malnutrition criteria.     NUTRITION DIAGNOSIS/PROBLEM:    Inadequate oral intake related to physiological causes as evidenced by extensive liver disease - ongoing    EVALUATION OF GOALS FROM PREVI Fair  Intake: 50%  Intake Meeting Needs: No, but supplements to maximize  Food Allergies: No  Cultural/Ethnic/Anabaptist Preferences Addresses: Yes    NUTRITION RELATED PHYSICAL FINDINGS:     1. Body Fat/Muscle Mass: well nourished per visual exam.     2. F

## 2019-05-13 NOTE — PROGRESS NOTES
Gastroenterology Progress Note  Patient Name: Bret Kennedy  Chief Complaint: ascites, Etoh hepatitis, Etoh abuse  S: Pt denies abdominal pain. He has been afebrile. He denies melena or hematochezia. He is eating well.    O: /50 (BP Location: Right count  3. Small varices on EGD 2018  4. Anemia: Iron studies suggestive of ACD; B12 and folate wnl; EGD 2018 with small varices and LA class A esophagitis and colonoscopy showed diverticulosis  Plan:   1.  Discussed peritoneal culture with ID who feels that

## 2019-05-13 NOTE — PROGRESS NOTES
[facility]                INFECTIOUS DISEASE PROGRESS NOTE    Asa Hampton Patient Status:  Inpatient    1960 MRN [id number]   [facility] 3NE-A Attending Sergio Schafer MD   Hosp Day # 8 PCP None Pcp     Antibiotics: Va Smear 1+ WBCs seen (A) N/A    Body Fld Smear No organisms seen (A) N/A    Body Fld Smear This is a cytocentrifuged smear.  (A) N/A    Body Fld Smear Gram stain of positive bottle shows: (A) N/A    Body Fld Smear Gram positive cocci in clusters (A) N/A Consultants  [phone]

## 2019-05-14 NOTE — PLAN OF CARE
Assumed care at 0730. Pt is A&O x4. On Ra, . NSR on tele. +1 pitting edema in the lower extremities. Refuses SCDs. Abdomen distended, paracentesis sites C/D/I.    Pt recommending TREVOR- pt refusing plan to go home   24 hour urine collection sen while performing ADLs such as feeding, grooming, and bathing  - Educate and encourage patient/family in tolerated functional activity level and precautions during self-care     Outcome: Progressing     Problem: SKIN/TISSUE INTEGRITY - ADULT  Goal: Skin int

## 2019-05-14 NOTE — PROGRESS NOTES
[facility]  Progress Note     Wava Kvng Patient Status:  Inpatient    1960 MRN [id number]   [facility] 4SW-A Attending Lachelle Hammonds MD   Hosp Day # 9 PCP None Pcp     Chief Complaint:  CP.  Swelling     S:  Pt without mL/min (based on SCr of 1.1 mg/dL). Recent Labs   Lab 05/10/19  0628 05/13/19  0923 05/14/19  0630   PTP 32.1* 28.3* 26.2*   INR 2.88* 2.46* 2.24*     No results for input(s): TROP, CK in the last 168 hours. Imaging: Imaging data reviewed in Epic.   AS

## 2019-05-14 NOTE — PROGRESS NOTES
Gastroenterology Progress Note  Patient Name: Jair Atkins  Chief Complaint: ascites, Etoh hepatitis, Etoh abuse  S: Pt denies abdominal pain. He has been afebrile. He denies melena or hematochezia. He is eating well.     O: /61 (BP Location: Left felt to be a contaminant   3. Small varices on EGD 2018  4. Anemia: Iron studies suggestive of ACD; B12 and folate wnl; EGD 2018 with small varices and LA class A esophagitis and colonoscopy showed diverticulosis  Plan:   1.  Discussed peritoneal culture wi

## 2019-05-14 NOTE — DISCHARGE SUMMARY
[facility] H[facility]PITAL[facility]  DISCHARGE SUMMARY     Obijodi Meza Patient Status:  Inpatient    1960 MRN [id number]   [facility] 3NE-A Attending Karsten Ferguson MD   [facility] Day # 9 PCP None Pcp     Date of Admission: 2019  Date of [address] readmission after discharge from the hospital.    Procedures during hospitalization:   • Paracentesis x2    Incidental or significant findings and recommendations (brief descriptions):  • none    Lab/Test results pending at Discharge:   · none    Consultan Tabs  Commonly known as:  ZANTAC        traMADol HCl 50 MG Tabs  Commonly known as:  ULTRAM              Where to Get Your Medications      These medications were sent to [facility] [address] TRINIDAD KUMARI [address] [place], [place] [address] [address] [phone], [phone]

## 2019-05-14 NOTE — PHYSICAL THERAPY NOTE
PHYSICAL THERAPY TREATMENT NOTE - INPATIENT    Room Number: 6311/4469-Q     Session: 4   Number of Visits to Meet Established Goals: 6    Presenting Problem: ETOH cirrhosis; Jaundice; ascites    History related to current admission:      Pt is 64 year old Static Sitting: Fair +  Dynamic Sitting: Fair -           Static Standing: Fair  Dynamic Standing: Fair -    ACTIVITY TOLERANCE                         O2 WALK                    AM-PAC '6-Clicks' INPATIENT SHORT FORM - BASIC MOBILITY  How much difficulty 2  2. Standing unsupported with eyes closed                                4  3. Reaching forward with outstretched arm while standing       4  4.   object from the floor from a standing position Goal #5  Pt will be independent with HEP. Goal #6  Pt will participate in Modified Luna Balance Assessment.- MET    Goal Comments: Goals established on 5/7/2019.  Ongoing 5/14/19

## 2019-05-14 NOTE — CM/SW NOTE
Left access aide [facility] information at bedside, patient sleeping. Call to Rafy Harrington [phone] left voice mail explaining information.     Darylene Getting, RN,   Phone [phone]  Pager [phone]

## 2019-05-14 NOTE — PLAN OF CARE
Assumed care at [facility]. Pt is A&O x4. On Ra, . NSR on tele. +1 pitting edema in the lower extremities. Refuses SCDs. Abdomen distended, paracentesis sites C/D/I. Pt recommending TREVOR. 24 hour urine collection. Cardiac diet.    ID and GI on functional ability and stability  - Promote increasing activity/tolerance for mobility and gait  - Educate and engage patient/family in tolerated activity level and precautions  Recommend use of rolling walker for transfers and ambulation.     Outcome: Prog Social Work and notify MD/LIP  - Provide appropriate education and resources to patient and/or family  - Initiate referral to Adult VINAY Powell, as appropriate  - Initiate referral to MALIKA Schuster, as appropriate  - Offer to have the p

## 2019-05-14 NOTE — CM/SW NOTE
11:21am  MSW spoke face to face with MD, he will complete rest of Medicaid form and MSW will return  to [facility] health care. Tube [id number]  Fax [phone]      1:50pm  MSW sent Medicaid forms signed by MD to [address] [place] [address] via Tube.

## 2019-05-14 NOTE — PROGRESS NOTES
NURSING DISCHARGE NOTE    Discharged Home via Wheelchair. Accompanied by Family member  Belongings Taken by patient/family. Reviewed pt med rec with pt and girlfriend- verbalized understanding. Removed continuous monitor and PIV.  Patient left in st

## 2019-05-15 NOTE — OCCUPATIONAL THERAPY NOTE
OCCUPATIONAL THERAPY TREATMENT NOTE - INPATIENT     Room Number: 7596/7580-D  Session:1  Number of Visits to Meet Established Goals: 5    Presenting Problem: ETOH cirrhosis    History related to current admission: Pt is 62year old male admitted on 5/5/201 the patient currently need…  -   Putting on and taking off regular lower body clothing?: A Little  -   Bathing (including washing, rinsing, drying)?: A Little  -   Toileting, which includes using toilet, bedpan or urinal? : None  -   Putting on and taking

## 2019-05-30 PROBLEM — K92.2 GASTROINTESTINAL HEMORRHAGE: Status: ACTIVE | Noted: 2019-01-01

## 2019-05-31 PROBLEM — D64.9 ANEMIA, UNSPECIFIED TYPE: Status: ACTIVE | Noted: 2019-01-01

## 2019-05-31 PROBLEM — K92.2 GASTROINTESTINAL HEMORRHAGE, UNSPECIFIED GASTROINTESTINAL HEMORRHAGE TYPE: Status: ACTIVE | Noted: 2019-01-01

## 2019-05-31 NOTE — CONSULTS
[facility]                       Gastroenterology Consultation-[facility]    Scot Doctor Patient Status:  Inpatient    1960 MRN [id number]   [facility] 8NE-A Attending Vinnie Cha MD   Hosp Day # 0 PCP None ondansetron HCl (ZOFRAN) injection 4 mg 4 mg Intravenous Q6H PRN   0.9%  NaCl infusion  Intravenous Continuous   ondansetron HCl (ZOFRAN) injection 4 mg 4 mg Intravenous Q6H PRN   Metoclopramide HCl (REGLAN) injection 10 mg 10 mg Intravenous Q8H PRN   Pa breath, asthma, copd, recurrent pneumonia            Hematologic: The patient reports no easy bruising, frequent gum bleeding or nose bleeding;  + known chronic anemia and thrombocytopenia             Dermatologic: The patient reports no recent rashes or c 05/31/2019    ALB 2.5 05/31/2019    ALKPHO 68 05/31/2019    BILT 10.0 05/31/2019    AST 38 05/31/2019    ALT 14 05/31/2019    PTT 35.8 05/30/2019    INR 2.43 05/31/2019    PTP 28.0 05/31/2019    MG 1.9 05/31/2019     Recent Labs   Lab 05/30/19  2217 05/31/ today, empiric antibiotics for SBP prophylaxis, paracentesis diagnostic and therapeutic,s withdrawal precautions.   Aime Todd MD  [facility] Gastroenterology  5/31/2019  8:56 PM

## 2019-05-31 NOTE — ANESTHESIA POSTPROCEDURE EVALUATION
[facility] Patient Status:  Inpatient   Age/Gender 62year old male MRN [id number]   Location [facility]. Attending Izabel Tamayo MD   Hosp Day # 0 PCP None Pcp       Anesthesia Post-op Note    Procedure(s):  ESOPHAG

## 2019-05-31 NOTE — OPERATIVE REPORT
Rae Chance Patient Status:  Inpatient    1960 MRN [id number]   [facility] ENDOSCOPY Attending Rio Valdez MD   Date 2019 PCP None Pcp     PREOPERATIVE DIAGNOSIS/INDICATION: GIB and post hemorrhagic anemia cute on chroni Protonix 40 mg q 12 hrs  - Continue ICU status  - Start non selective b blockers when stable  - EGD with esophageal banding in 2 weeks  - Follow H/H  Nyasia Ospina  5/31/2019  3:15 PM

## 2019-05-31 NOTE — PROGRESS NOTES
NURSING ADMISSION NOTE      Patient admitted via Cart  Oriented to room. Safety precautions initiated. Bed in low position. Call light in reach. Completed admission assessment with patient.   Orders received by MD  GI consult in ER  Tele NSR  PHQ4

## 2019-05-31 NOTE — PLAN OF CARE
Alert,oriented x3  Denies nausea and vomiting  Completed blood transfusion and 1 unit of ffp, tolerated well  Abdomen enlarged ,and both lower extremeties swollen 3+  Able to sit up on side of bed to void  Tele SR 80'S  NPO for EGD today

## 2019-05-31 NOTE — ED PROVIDER NOTES
Patient Seen in: [facility] Emergency Department    History   Patient presents with:  GI Bleeding (gastrointestinal)    Stated Complaint: GI bleed     HPI    55-year-old male presents with GI bleed.   Patient reports he began vomiting bright red bloo Physical Exam    General:  Vitals as listed. Jaundiced. Chronically ill-appearing. HEENT: Anicteric sclera. Conjunctivae show no pallor.   Oropharynx clear, mucous membranes moist   Neck: supple, no rigidity   Lungs: good air exchange and clear Please view results for these tests on the individual orders. TYPE AND SCREEN    Narrative: The following orders were created for panel order TYPE AND SCREEN.   Procedure                               Abnormality         Status Gastrointestinal hemorrhage K92.2 5/30/2019 Unknown            Present on Admission           ICD-10-CM Noted POA    Gastrointestinal hemorrhage K92.2 5/30/2019 Unknown

## 2019-05-31 NOTE — PLAN OF CARE
Patient is alert and oriented. Patient complaining of abdominal pain and neck pain, heat pack applied to neck. Unable to given tylenol or NSAIDs due to cirrhosis of liver and risk of bleeding. No bleeding complications noted.  Vitals signs are stable and ca

## 2019-05-31 NOTE — PROGRESS NOTES
[facility]  Progress Note     Luana Simmons Patient Status:  Inpatient    1960 MRN [id number]   [facility] ENDOSCOPY Attending Saadia Quinones MD   Hosp Day # 0 PCP None Pcp     Chief Complaint: GIB    S: Patient has no new c hours.         Imaging: Imaging data reviewed in Epic.     Medications:   • pantoprazole (PROTONIX) IV push  40 mg Intravenous Q12H   • cefTRIAXone  1 g Intravenous Q24H   • phytonadione (VITAMIN K) IVPB  10 mg Intravenous Daily   • nicotine  1 patch Transd

## 2019-05-31 NOTE — ANESTHESIA PREPROCEDURE EVALUATION
PRE-OP EVALUATION    Patient Name: Nikkie Lazaro    Pre-op Diagnosis: Hematoemesis    Procedure(s):  ESOPHAGOGASTRODUODENOSCOPY    Surgeon(s) and Role:     Emerson Pereira MD - Primary    Pre-op vitals reviewed.   Temp: 99 °F (37.2 °C)  Pulse: 79  R Thiamine HCl 100 MG Oral Tab Take 1 tablet (100 mg total) by mouth daily. Disp: 30 tablet Rfl: 1   lactulose 20 GM/30ML Oral Solution Take 30 mL (20 g total) by mouth 2 (two) times daily.  Disp: 1800 mL Rfl: 0   furosemide 40 MG Oral Tab Take 1 tablet (40 (H) 05/30/2019    MCH 39.5 (H) 05/30/2019    MCHC 35.9 05/30/2019    RDW 14.6 05/30/2019    PLT 55.0 (L) 05/30/2019     Lab Results   Component Value Date     (L) 05/31/2019    K 4.8 05/31/2019    CL 99 05/31/2019    CO2 27.0 05/31/2019    BUN 11 05/

## 2019-05-31 NOTE — H&P
[facility]  History and Physical     Jair Atkins Patient Status:  Emergency    1960 MRN [id number]   Location [address] [facility] Attending Tiny Wood MD   Hosp Day # 0 PCP None Pcp     Chief Complaint: GIB    His (two) times daily. Disp: 1800 mL Rfl: 0   furosemide 40 MG Oral Tab Take 1 tablet (40 mg total) by mouth daily. Disp: 30 tablet Rfl: 1   spironolactone 100 MG Oral Tab Take 1 tablet (100 mg total) by mouth daily.  Disp: 30 tablet Rfl: 1   gabapentin 300 MG CO2 27.0   ALKPHO 84   AST 47*   ALT 18   BILT 9.7*   TP 6.0*       Estimated Creatinine Clearance: 71.5 mL/min (based on SCr of 1.09 mg/dL).     Recent Labs   Lab 05/30/19  9147   PTP 25.4*   INR 2.16*       No results for input(s): TROP, CK in the last

## 2019-06-01 NOTE — PLAN OF CARE
Patient is alert and orientated x4. Reports abdominal pain, PRN toradol given. VSS, SR on tele, room air. IVF. Octreo. Drip running at 10 ml/hr. Hgb q 6 hrs. Clear liquid diet. 6L fluid off from paracentesis. Jaundice. Will continue to monitor.         Prob trends  Outcome: Progressing     Problem: COPING  Goal: Pt/Family able to verbalize concerns and demonstrate effective coping strategies  Description  INTERVENTIONS:  - Assist patient/family to identify coping skills, available support systems and cultural

## 2019-06-01 NOTE — PROGRESS NOTES
[facility]  Progress Note     Eh Bhakta Patient Status:  Inpatient    1960 MRN [id number]   [facility] ENDOSCOPY Attending Param Rubio MD   [facility] Day # 1 PCP None Pcp     Chief Complaint: GIB    S: very hungry and want Creatinine Clearance: 67.2 mL/min (based on SCr of 1.16 mg/dL). Recent Labs   Lab 05/31/19  0534 05/31/19  1143 06/01/19  0600   PTP 28.0* 24.2* 25.7*   INR 2.43* 2.03* 2.19*       No results for input(s): TROP, CK in the last 168 hours.          Imaging

## 2019-06-01 NOTE — PLAN OF CARE
A/O x4. VSS. NSR on tele. Room air. Octreotide drip maintained @ 10mL/hr  CIWA protocol. Scores remain <7 at this time  Seizure precautions in place  Patient complains of headache & back pain. Given toradol per orders. NPO  IV abx infusing.  NS infusing @ sites to achieve adequate hemostasis  - Assess for signs and symptoms of internal bleeding  - Monitor lab trends  Outcome: Progressing     Problem: COPING  Goal: Pt/Family able to verbalize concerns and demonstrate effective coping strategies  Description

## 2019-06-01 NOTE — PROGRESS NOTES
06/01/19 1420   Clinical Encounter Type   Visited With Patient   Routine Visit   (Responded to request for consult - spiritual care)   Sabianist Encounters   Sabianist Needs Prayer   Patient Spiritual Encounters   Spiritual Assessment Completed Yes   Sp

## 2019-06-02 NOTE — PROGRESS NOTES
[facility] Resource Referral Counselor Note    Obi Hippo Patient Status:  Inpatient    1960 MRN [id number]   [facility] 3NE-A Attending Maria Antonia Chávez MD   Hosp Day # 2 PCP None Pcp       S(subjective) \"I am feeling d

## 2019-06-02 NOTE — PROGRESS NOTES
[facility]  Progress Note     Aamir Charter Patient Status:  Inpatient    1960 MRN [id number]   [facility] ENDOSCOPY Attending Krzysztof Terry MD   [address] Day # 2 PCP None Pcp     Chief Complaint: GIB    S: no new complaints BILT 9.7* 10.0* 14.6*   TP 6.0* 5.6* 6.1*       Estimated Creatinine Clearance: 67.2 mL/min (based on SCr of 1.16 mg/dL).     Recent Labs   Lab 05/31/19  1143 06/01/19  0600 06/02/19  0745   PTP 24.2* 25.7* 27.3*   INR 2.03* 2.19* 2.36*       No results f

## 2019-06-02 NOTE — PLAN OF CARE
A/O x4. VSS. NSR on tele. Room air. Octreotide drip maintained @ 10 mL/hr  CIWA protocol discontinued per protocol  Administered albumin this shift per orders  RLQ abdominal incision from paracentesis C/D/I  Advance diet as tolerated.  Pt tolerated clear l medication administration  - Ensure safe mobilization of patient  - Hold pressure on venipuncture sites to achieve adequate hemostasis  - Assess for signs and symptoms of internal bleeding  - Monitor lab trends  Outcome: Progressing     Problem: COPING  Go

## 2019-06-02 NOTE — PLAN OF CARE
Aox4   VSS, on RA  No c/o pain   HGB Q6  IV Rocephin   Octreotide gtt infusing at 10cc/hr  Fluids D/C'd  Pt tolerating soft diet  Possible D/C tomorrow      Problem: GASTROINTESTINAL - ADULT  Goal: Minimal or absence of nausea and vomiting  Description  IN and demonstrate effective coping strategies  Description  INTERVENTIONS:  - Assist patient/family to identify coping skills, available support systems and cultural and spiritual values  - Provide emotional support, including active listening and acknowledg

## 2019-06-02 NOTE — PROGRESS NOTES
[facility]  Progress Note    Asa Hampton Patient Status:  Inpatient    1960 MRN [id number]   [facility] 3NE-A Attending Danielle Zuleta MD   Date 2019 PCP None Pcp     Subjective:  Asa Hampton is a(n) 62year old ma encephalopathy (HCC)     SBP (spontaneous bacterial peritonitis) ([facility] [address]     Gastrointestinal hemorrhage     Acute blood loss anemia     Gastrointestinal hemorrhage, unspecified gastrointestinal hemorrhage type     Anemia, unspecified type    Impression:  Et

## 2019-06-02 NOTE — PROGRESS NOTES
[facility]  Progress Note    Rae Jackson Patient Status:  Inpatient    1960 MRN [id number]   [facility] 3NE-A Attending Rio Valdez MD   Date 2019 PCP None Pcp     Subjective:  Rae Jackson is a(n) 62year old ma banding  Ascites high SAAG non SBP on prophylaxis  LE edema  Thrombocytopenia  Coagulopathy    Plan:  Continue SBP prophylaxis x 5 days  Discontinue Octreotide drip tomorrow AM  Start non selective b blocker propranolol 10 mg PO BID when off Octreotide dri

## 2019-06-03 NOTE — PLAN OF CARE
Assumed care of the patient @ 07:00, resting in bed. Alert x's 4, denies pain. IV patent, voiding freely, tolerating diet. Vitals stable, SR on tele. Plan for IR tomorrow, complete IV antibiotic course, daily cbc now. Possible d'c tomorrow.  Pt updated on p venipuncture sites to achieve adequate hemostasis  - Assess for signs and symptoms of internal bleeding  - Monitor lab trends  Outcome: Progressing

## 2019-06-03 NOTE — PROGRESS NOTES
[facility]  Progress Note     Kanwal Howard Patient Status:  Inpatient    1960 MRN [id number]   [facility] ENDOSCOPY Attending Marley Dorsey MD   [facility] Day # 3 PCP None Pcp     Chief Complaint: GIB    S: no new complaints K 4.1 4.8 4.3   CL 97* 99 101   CO2 27.0 27.0 27.0   ALKPHO 84 68 63   AST 47* 38* 45*   ALT 18 14* 17   BILT 9.7* 10.0* 14.6*   TP 6.0* 5.6* 6.1*       Estimated Creatinine Clearance: 67.2 mL/min (based on SCr of 1.16 mg/dL).     Recent Labs   Lab 05/31/

## 2019-06-03 NOTE — IMAGING NOTE
Spoke with Verónica Crow RN and informed her that pt to have paracentesis in [address] tomorrow at 1300. Hold blood thinners, okay w/Clear liquids within 4 hours and have labs done in am, PT/INR and repeat CBC. She states pt can sign his own consent.

## 2019-06-03 NOTE — PLAN OF CARE
Assumed care at [address] [place] [address]. A&O x 4. On RA tolerating well. NSR on tele. Sandostatin drip at 10 mL/hr, per MD note it will be discontinued in the Am, no orders for d/c. Hgb q6h. Patient is frustrated and angry with staff at [facility] blood draw, calling staff names.  P and symptoms of bleeding or hemorrhage  - Monitor labs and vital signs for trends  - Administer supportive blood products/factors, fluids and medications as ordered and appropriate  - Administer supportive blood products/factors as ordered and appropriate

## 2019-06-04 NOTE — PLAN OF CARE
Assumed care at 0730. Pt a/ox4, VSS< on RA, NSR on telemetry. Pt with no c/o pain, n/v/d, SOB, dizziness/lightheadedness. Abdomen distended. Dressing from previous paracentesis to RLQ is c/d/I. Plan for repeat paracentesis today at 1300.  AM medications giv HEMATOLOGIC - ADULT  Goal: Maintains hematologic stability  Description  INTERVENTIONS  - Assess for signs and symptoms of bleeding or hemorrhage  - Monitor labs and vital signs for trends  - Administer supportive blood products/factors, fluids and medicat

## 2019-06-04 NOTE — PROCEDURES
[facility]  Procedure Note    Julianne Sanchez Patient Status:  Inpatient    1960 MRN [id number]   [facility] 3NE-A Attending Huan Woods MD   Hosp Day # 4 PCP None Pcp   LOCATION: Right mid abdomen  FLUID REMOVED: 4.4 L  MEDIC

## 2019-06-04 NOTE — PROGRESS NOTES
NURSING DISCHARGE NOTE    Discharged Home via Wheelchair. Accompanied by Family member and Support staff  Belongings Taken by patient/family. Pt discharged home. Discharge papers given to pt.  Prescriptions sent to pt pharmacy by MD. Pt understands

## 2019-06-04 NOTE — PLAN OF CARE
Received patient awake in bed. AOx4. Denies any SOB. On room air, clear lungs. . SR on tele. Electrolyte protocol. Voids to the bathroom. Steady on his feet. Saline locked. Has mild pain, generalized. Call light and bedside table within reach.  Refused b enemas and rectal medication administration  - Ensure safe mobilization of patient  - Hold pressure on venipuncture sites to achieve adequate hemostasis  - Assess for signs and symptoms of internal bleeding  - Monitor lab trends  Outcome: Progressing     P

## 2019-06-05 NOTE — DISCHARGE SUMMARY
[facility]PITAL[facility]  DISCHARGE SUMMARY     Leo Doctor Patient Status:  Inpatient    1960 MRN [id number]   [facility] 3NE-A Attending No att. providers found   Hosp Day # 4 PCP None Pcp     Date of Admission: 2019  Date of Disc daily.   Quantity:  30 tablet  Refills:  1     gabapentin 300 MG Caps  Commonly known as:  NEURONTIN      Take 1 capsule (300 mg total) by mouth 2 (two) times daily.    Quantity:  60 capsule  Refills:  0     lactulose 20 GM/30ML Soln  Commonly known as:  EN CORNEL Schuler    Patient Instructions:                          -----------------------------------------------------------------------------------------------  PATIENT DISCHARGE INSTRUCTIONS: See electronic chart    Ashley Cross MD    Time sp

## 2019-06-05 NOTE — PROGRESS NOTES
ORAL LMTCB for a condition update. Provided TCC CB ph#[phone].   HFU at [facility] 6/7/19 12:30pm.

## 2019-06-07 PROBLEM — I85.01 BLEEDING ESOPHAGEAL VARICES (HCC): Status: ACTIVE | Noted: 2019-01-01

## 2019-06-07 NOTE — PATIENT INSTRUCTIONS
PATIENT INSTRUCTIONS:   1. Stop taking Zantac over the counter and start taking pantoprazole (Protonix) 40 mg - 1 tablet TWICE daily   2. Please make an appointment with Dr. Anna Valencia for follow-up  3.  Contact the A for Primary Care Follow Up    [phone]

## 2019-06-07 NOTE — PROGRESS NOTES
TRANSITIONAL CARE CLINIC PHARMACIST MEDICATION RECONCILIATION        Luana Simmons MRN [id number]    1960 PCP None Pcp       Comments: Medication history completed in [address] [place] [address] by pharmacist.     The following medication changes oc

## 2019-06-07 NOTE — PROGRESS NOTES
Transitional Care Clinic Visit  [facility] TRANSITIONAL CARE CLINIC      HISTORY   CHIEF COMPLAINT: post hospital follow up visit  HPI: Rae Jackson is a 62year old male here today for  follow up after being hospitalized for a GIB.   Kiki Stanley Transdermal Patch 24 Hr Place 1 patch onto the skin daily. Disp: 30 patch Rfl: 1   B Complex Vitamins (VITAMIN B COMPLEX OR) Inject as directed. Disp:  Rfl:    Cholecalciferol (VITAMIN D) 2000 units Oral Tab Take by mouth.  Disp:  Rfl:      No current facil  (H) 06/01/2019 06:00 AM     (L) 06/01/2019 06:00 AM    K 4.3 06/01/2019 06:00 AM     06/01/2019 06:00 AM    CO2 27.0 06/01/2019 06:00 AM    BUN 20 (H) 06/01/2019 06:00 AM    CREATSERUM 1.16 06/01/2019 06:00 AM    CA 8.5 06/01/2019 06:00 spironolactone, thiamine, vit B, vit D, folic acid    2.   Hospital Follow Up  · Patient declines assistance with scheduling appointment with [facility] or GI; states he will make his own appointments      No orders of the defined types were placed in this encount

## 2019-06-27 NOTE — OPERATIVE REPORT
Jennifer Villar Patient Status:  Hospital Outpatient Surgery    1960 MRN [id number]   [facility] ENDOSCOPY Attending Paz Mcwilliams MD   Date 2019 PCP None Pcp     PREOPERATIVE DIAGNOSIS/INDICATION: H/o variceal bleed, dannielle

## 2019-06-27 NOTE — H&P
[address] [facility] Patient Status:  Hospital Outpatient Surgery    1960 MRN [id number]   Location [facility] Attending Destiny Nance MD   Date 2019 PCP None Pcp     CC: H/o variceal bleed Nontender. No masses. Bowel sounds are present. Extremities: No edema, cyanosis, or clubbing. Skin: Warm and dry.       Assessment/Plan/Procedure:  Patient Active Problem List:     Gastroesophageal reflux disease without esophagitis     Hyponatremia

## 2019-07-03 PROBLEM — E86.0 DEHYDRATION: Status: ACTIVE | Noted: 2019-01-01

## 2019-07-03 PROBLEM — R53.1 WEAKNESS GENERALIZED: Status: ACTIVE | Noted: 2019-01-01

## 2019-07-03 NOTE — ED INITIAL ASSESSMENT (HPI)
Patient presents with multiple falls over the past few days. Left sided weakness for 4-5 days per patient. Speech is slurred. Left sided facial droop is noted by this RN.

## 2019-07-03 NOTE — H&P
[facility]                                                               History & Physical         Bala Santo Patient Status:  Emergency    1960 MRN [id number]   Location [address] [facility] Attending Mimi Arias Arthritis    • Back pain    • Belching    • Bloating    • Constipation    • Enlarged liver     Alcoholic Cirrhosis of liver with Ascites   • Flatulence/gas pain/belching    • Frequent urination    • Frequent use of laxatives    • Heartburn    • Irregular b rhonchi. Cardiovascular: S1, S2.  Regular rate and rhythm. No murmurs. Equal pulses   Abdomen: Distended, non-tender at this time, ascites present. Positive bowel sounds.  No rebound tenderness  Neurologic: Awake, lethargic and confused, no facial biopsy Ventricles and sulci are normal in size. INTRACRANIAL:  There are no abnormal extraaxial fluid collections. There is no midline shift. There are no intraparenchymal brain abnormalities. There is nothing specific for acute infarct.   There is no hemorr edema  2. GI consult  3. Lactulose   4. Thiamine and folic acid  5. Check abdominal ultrasound   6.  We will give vitamin K today due to coagulopathy due to liver disease, check INR in a.m.  7. May need paracentesis, may need albumin infusion before paracen

## 2019-07-03 NOTE — ED NOTES
Familia Mccoy from [facility] arrived to assess patient. Pt insurance is out of network with [facility] and will need placement elsewhere. Familia Mccoy will assess patient and call the patient wife and update this RN with determination.      Please disregard above note doc

## 2019-07-03 NOTE — ED NOTES
Nurse to Nurse Report called to [facility] RN pt okay to floor. Patient transport ordered.

## 2019-07-03 NOTE — ED PROVIDER NOTES
Patient Seen in: [facility] Emergency Department    History   Patient presents with:  Fall (musculoskeletal, neurologic)    Stated Complaint: multiple falls, left sided weakness for five days    HPI    Patient presents with leg weakness and multiple 5/31/2019    Performed by Gabe Qureshi MD at [facility] ENDOSCOPY   • ESOPHAGOGASTRODUODENOSCOPY (EGD) N/A 2/13/2018    Performed by Amol Sweet MD at [facility] ENDOSCOPY           Social History    Tobacco Use      Smoking status: Current Some Day Smoker Bilirubin, Total 14.0 (*)     Albumin 2.6 (*)     Globulin  4.5 (*)     A/G Ratio 0.6 (*)     All other components within normal limits   PROTHROMBIN TIME (PT) - Abnormal; Notable for the following components:    PT 24.4 (*)     INR 2.05 (*)     All oth experiencing multiple falls over the past few days, left sided weakness, and slurred speech. FINDINGS:  VENTRICLES/SULCI:  Ventricles and sulci are normal in size. INTRACRANIAL:  There are no abnormal extraaxial fluid collections.   There is no midline small fluid bolus as he appeared dehydrated. He was given oral lactulose for his elevated ammonia level. MDM   The patient is very weak and will need to be admitted for further treatment. His weakness is more generalized and I do not suspect a stroke.

## 2019-07-04 NOTE — PHYSICAL THERAPY NOTE
PHYSICAL THERAPY EVALUATION - INPATIENT     Room Number: 6021/9651-W  Evaluation Date: 7/4/2019  Type of Evaluation: Initial  Physician Order: PT Eval and Treat    Presenting Problem: weakness  Reason for Therapy: Mobility Dysfunction and Discharge P 2/13/2018    Performed by Day Mendoza MD at [address] [place] [address]  Type of Home: Apartment   Home Layout: One level  Stairs to Enter : 10  Railing: Yes          Lives With: Significant other     Patient Owned Equipment: Rolling walker;C sitting on the side of the bed?: A Lot   How much help from another person does the patient currently need. ..   -   Moving to and from a bed to a chair (including a wheelchair)?: A Little   -   Need to walk in hospital room?: A Lot   -   Climbing 3-5 steps comorbidities manifest themselves as functional limitations in independent bed mobility, transfers, and gait. Pt requiring A with bed mobility, transfers, and gait household distances.   Pt with multiple LOB and requires physical and verbal guidance for sa

## 2019-07-04 NOTE — PROGRESS NOTES
[facility]  Progress Note     Scot Doctor Patient Status:  Observation    1960 MRN [id number]   [facility] 3NE-A Attending Roswell Sicard, MD   Hosp Day # 0 PCP None Pcp     Chief Complaint: Tired    S: Patient reports he i INR 2.05* 2.29*       No results for input(s): TROP, CK in the last 168 hours. Imaging: Imaging data reviewed in Epic.     Medications:   • furosemide  40 mg Oral Daily   • spironolactone  100 mg Oral Daily   • Pantoprazole Sodium  40 mg Oral BID

## 2019-07-04 NOTE — PLAN OF CARE
1630: Patient had unwitnessed fall from commode. Patient was left unsupervised on commode, came back to patient sitting on the floor. Injuries noted are R elbow skin tear and back abrasion. Bruises were previously noted from falls prior to admission.  MD mckee

## 2019-07-04 NOTE — CONSULTS
[facility]                       Gastroenterology Consultation-[facility]    Catalino Gardner Patient Status:  Observation    1960 MRN [id number]   [facility] 3NE-A Attending Royal Shikha MD   [facility] spironolactone (ALDACTONE) tab 100 mg 100 mg Oral Daily   Pantoprazole Sodium (PROTONIX) EC tab 40 mg 40 mg Oral BID AC   rifaximin (XIFAXAN) TABS 550 mg 550 mg Oral BID   [COMPLETED] sodium chloride 0.9% IV bolus 500 mL 500 mL Intravenous Once   [COMPLE hematologic malignancy           ENT: The patient reports no hoarseness of voice, hearing loss, sinus congestion, tinnitus           Neurologic: The patient reports no history of seizure, stroke, or frequent headaches    PE: /46 (BP Location: Left ar 07/03/19  1349 07/04/19  0633   ALT 18 15*   AST 42* 33       Imaging: =====  CONCLUSION:    1. Hepatic cirrhosis with portal hypertension and recanalized umbilical vein.   2. Echogenic sludge within the gallbladder without discrete evidence of stones, wall

## 2019-07-04 NOTE — PROGRESS NOTES
Received pt. Back from ultrasound at 2130. RN from CTU 3 completed CIWA assessment, rated at a 3. Last drink was approximately 3 days ago. Has tremors, baseline for pt. Paged Dr. Randy Alpers about inability to perform CIWA assessments on this floor.  Obtained tr

## 2019-07-04 NOTE — PROGRESS NOTES
Assumed patient care at 2350.   Patient transferred from ortho/spine for CIWA protocol  Patient A&O x 4  Very drowsy  Confused and forgetful at times  No current complaints of pain  Seizure precautions  CIWA Q2 hours  VSS  Tele-SR  Lactulose PO- elevated am

## 2019-07-04 NOTE — PROGRESS NOTES
Patient arrived on unit from ER. Patient alert, speech inarticulate/slurred at times. Jaundiced. Ascitic abdomen. Reports some discomfort to left foot.    US ordered, calling for patient shortly after arrival, patient has been NPO since 7/2 per his report

## 2019-07-04 NOTE — PLAN OF CARE
Assumed care at 0700. A&O x 4, confused and drowsy. On RA tolerating well. NSR on tele. Seizure precautions in place. CIWA q2h. Paracentesis ordered. Lactulose 4 times a day titrate to 2-3 bowel movements a day. Jaundice. 3+ edema to legs. Ascites.  PT/OT t and ambulation using safe patient handling equipment as needed  - Ensure adequate protection for wounds/incisions during mobilization  - Obtain PT/OT consults as needed  - Advance activity as appropriate  - Communicate ordered activity level and limitation

## 2019-07-04 NOTE — PROGRESS NOTES
Spoke to Dr. Moises Lr about new GI consult. Per him, change lactulose to 4 times daily and he will see pt. Tomorrow morning. Orders updated.

## 2019-07-05 NOTE — PROGRESS NOTES
Gastroenterology Progress Note  S: Remains lethargic but responds appropriately, denies complaints  O: /48 (BP Location: Right arm)   Pulse 97   Temp 98.2 °F (36.8 °C) (Oral)   Resp 22   Ht 67\"   Wt 179 lb 14.3 oz   SpO2 97%   BMI 28.18 kg/m²   Gen:

## 2019-07-05 NOTE — PROGRESS NOTES
[facility]  Progress Note     Dmaon Garcia Patient Status:  Observation    1960 MRN [id number]   [facility] 3NE-A Attending Miranda Lopez MD   Hosp Day # 0 PCP None Pcp     Chief Complaint: Tired    S: Patient reports he n INR 2.05* 2.29* 2.27*       No results for input(s): TROP, CK in the last 168 hours. Imaging: Imaging data reviewed in Epic.     Medications:   • Potassium Chloride ER  40 mEq Oral Q4H   • furosemide  40 mg Oral Daily   • spironolactone  100 mg Or

## 2019-07-05 NOTE — PLAN OF CARE
Assumed care at 0730. A&O x 3. Confused and drowsy. Answers orientation questions appropriately but then states to put things in the garage and answers call light as a phone. On RA tolerating well. NSR/ST on tele. Up in chair. Lactulose given.  When walking appropriate  - Communicate ordered activity level and limitations with patient/family  Outcome: Progressing     Problem: SAFETY ADULT - FALL  Goal: Free from fall injury  Description  INTERVENTIONS:  - Assess pt frequently for physical needs  - Identify co

## 2019-07-05 NOTE — PROCEDURES
PROCEDURE: US PARACENTESIS ABDOMEN W IMAGING  (RER=[id number])    COMPARISON: None. INDICATIONS: ascites    DESCRIPTION OF PROCEDURE: Informed consent was obtained. The patient was prepped and draped in the standard sterile fashion.   An ultrasound-guided pa

## 2019-07-05 NOTE — PHYSICAL THERAPY NOTE
PHYSICAL THERAPY TREATMENT NOTE - INPATIENT    Room Number: 6471/7050-A     Session: 1   Number of Visits to Meet Established Goals: 5    Presenting Problem: weakness     History related to current admission: 61 yo male with hx of ETOH cirrhosis, ascites you show me how to use that gait belt? I wear it at home sometimes. Patient’s self-stated goal is to return home.      OBJECTIVE  Precautions: Seizure;Bed/chair alarm    WEIGHT BEARING RESTRICTION  Weight Bearing Restriction: None                PAIN AS management. Chair follow for safety present. Pt ambulates with flat foot initial contact, poor foot clearance, and tending to veer right. Pt given multiple VC for use of RW and turning left.  Pt able to initiate, but difficulty maintaining and perceiving am I level in functional mobility.     DISCHARGE RECOMMENDATIONS  PT Discharge Recommendations: Sub-acute rehabilitation(ELOS 12-14 days)     PLAN  PT Treatment Plan: Patient education;Gait training;Strengthening;Stair training;Transfer training;Balance traini

## 2019-07-05 NOTE — OCCUPATIONAL THERAPY NOTE
OCCUPATIONAL THERAPY EVALUATION - INPATIENT     Room Number: 0296/6906-P  Evaluation Date: 7/5/2019  Type of Evaluation: Initial  Presenting Problem: generalized weakness, hepatic encephalopathy, dehydration, jaundice     Physician Order: IP Consult to Occ ENDOSCOPY   • ESOPHAGOGASTRODUODENOSCOPY (EGD) N/A 2/13/2018    Performed by Micki Solis MD at [address]  Type of Home: Apartment  Home Layout: One level  Lives With: Significant other    Toilet and E PERCEPTION  Left Attention:   impaired  Clock draw completed, pt with poorer spacing on left sided of the clock with all numbers included but crowded   Pt noted to run into R side of the hallway multiple times, max verbal/visual/tactile cues for turnin verbal/visual/tactile cues for turning to L side. Clock draw assessment completed, see perception section for details. Pt was educated on safety precautions. Pt was left in his chair with questions answered, needs met and call light within reach.  Pt's RN/P assessments, several treatment options    Overall Complexity MODERATE     OT Discharge Recommendations: Sub-acute rehabilitation(ELOS 12-14 days )  OT Device Recommendations: TBD    PLAN  OT Treatment Plan: Balance activities; Energy conservation/work simpl

## 2019-07-05 NOTE — CM/SW NOTE
CHRISTY Barba  Patient discussed in care rounds. Patient not yet able to make his own decisions, documentation is alert, confused, alert etc...      11:25am  MSW called pt's girlfriend and left VM to see if patient has ever been to [facility] before.        11:54am MSW

## 2019-07-06 NOTE — PROCEDURES
[facility]  Procedure Note    Ganga Sidle Patient Status:  Inpatient    1960 MRN [id number]   [facility] 3NE-A Attending Mariana Wynn MD   Hosp Day # 3 PCP None Pcp     Procedure: Paracentesis    Pre-Procedure Diagnosis:  A

## 2019-07-06 NOTE — PLAN OF CARE
Pt is a/o x1. Room air. ST on tele   Pt very agitated today. Combative with staff. When attempting to remove restraints to reposition patient, pt hit two staff members. Security was called and assisted staff with repositioning.  Pt attempting to hit and kic integrity remains intact  Description  INTERVENTIONS  - Assess and document risk factors for pressure ulcer development  - Assess and document skin integrity  - Monitor for areas of redness and/or skin breakdown  - Initiate interventions, skin care algorit as ordered  Outcome: Progressing     Problem: Impaired Functional Mobility  Goal: Achieve highest/safest level of mobility/gait  Description  Interventions:  - Assess patient's functional ability and stability  - Promote increasing activity/tolerance for m

## 2019-07-06 NOTE — PLAN OF CARE
Pt A&Ox 1-2. Room air, tolerating well. NSR on tele. Fall precautions in place. Lactulose 4x daily, 4 BM today. RLQ paracentesis dressing reinforced with moderate drainage. NPO for paracentesis today. 3+ edema to bilateral legs.  Staff will continue to [facility] MUSCULOSKELETAL - ADULT  Goal: Return mobility to safest level of function  Description  INTERVENTIONS:  - Assess patient stability and activity tolerance for standing, transferring and ambulating w/ or w/o assistive devices  - Assist with transfers and am tolerated activity level and precautions  - Recommend use of  RW for transfers and ambulation with chair follow   Outcome: Progressing     Problem: Impaired Activities of Daily Living  Goal: Achieve highest/safest level of independence in self care  Pham

## 2019-07-06 NOTE — PROGRESS NOTES
Gastroenterology Progress Note  S:Much more agitated overnight, combative. Was having 2-3 BM's/day,taking his lactulose and pills.  Had 5.5 L removed yesterday but unfortunately was not sent for fluid analysis  O: /52 (BP Location: Left arm)   Pulse 1 Pt was notified of her test results per Dr. Parson's note below:  Please let pt know that her potassium is normal.   Thanks,PV

## 2019-07-06 NOTE — PROGRESS NOTES
[facility]  Progress Note     Matt Michael Patient Status:  Inpatient    1960 MRN [id number]   [facility] 3NE-A Attending Tay Smith MD   Hosp Day # 3 PCP None Pcp     Chief Complaint: confusion    S: Patient is confuse Pantoprazole Sodium  40 mg Oral BID AC   • rifaximin  550 mg Oral BID   • Thiamine HCl  100 mg Oral Daily   • multivitamin with minerals  1 tablet Oral Daily   • folic acid  1 mg Oral Daily   • lactulose  20 g Oral QID       ASSESSMENT / PLAN:     1.  Hepat

## 2019-07-07 NOTE — PROGRESS NOTES
Gastroenterology Progress Note  S:Remains agitated, combative, at night. Currently disoriented, wants his wrist restraints off. Taking his meds.  Another liter removed yesterday  O: /49 (BP Location: Left arm)   Pulse 109   Temp 97.7 °F (36.5 °C) (Ora

## 2019-07-07 NOTE — PLAN OF CARE
Patient alert x1-2. Calm and cooperative. Agitated at times. Smith vest, bilateral wrist and bilateral ankle restraints on. Frequent rounding, turning, and changing. On Ra, . NSR-ST on tele. Denies pain. IV abx. Lactulose.   Resting comfortably precautions as indicated by assessment.  - Educate pt/family on patient safety including physical limitations  - Instruct pt to call for assistance with activity based on assessment  - Modify environment to reduce risk of injury  - Provide assistive device family of reasons restraints applied  - Assess for any contributing factors to confusion (electrolyte disturbances, delirium, medications)  - Discontinue any unnecessary medical devices as soon as possible  - Assess the patient's physical comfort, circulat

## 2019-07-07 NOTE — CONSULTS
[facility]  Report of Consultation    Luana Simmons Patient Status:  Inpatient    1960 MRN [id number]   [facility] 3NE-A Attending Christa Carney MD   Hosp Day # 4 PCP None Pcp     Consulting Service: Psychiatry  Reason for Con Uncomfortable fullness after meals    • Vomiting blood      Past Surgical History:   Procedure Laterality Date   • ABD PARACENTESIS     • COLONOSCOPY N/A 2/13/2018    Performed by Abdirizak Carrizales MD at [facility] ENDOSCOPY   • ESOPHAGOGASTRODUODENOSCOPY (EGD) QID    Exam:  Blood pressure 125/49, pulse 109, temperature 97.7 °F (36.5 °C), temperature source Oral, resp. rate 20, height 67\", weight 184 lb 3.2 oz, SpO2 98 %.     Appearance: disheveled  Behavior: agitated  Attitude: oppositional and hostile  Attentio

## 2019-07-07 NOTE — PROGRESS NOTES
[facility]  Progress Note     Obi Hippo Patient Status:  Inpatient    1960 MRN [id number]   [facility] 3NE-A Attending Siddharth Choi MD   Hosp Day # 4 PCP None Pcp     Chief Complaint: Hepatic encephalopathy    S: Charles Daily   • folic acid  1 mg Oral Daily   • lactulose  20 g Oral QID       ASSESSMENT / PLAN:     1. Hepatic encephalopathy  2. Alcoholic cirrhosis with ascites  3. Substance dependence disorder  4. Coagulopathy d/t cirrhosis  5. Anemia  6.  Thrombocytopenia

## 2019-07-07 NOTE — PLAN OF CARE
Pt A&Ox1 Room Air  Pt is very anxious and agitated and un-cooperative  NSR-ST on Tele  Currently have on posey, wrist and leg restraints in place  Seizure protocol   Meds given per Mar  Lactolose 4x daily  RLQ dressing clean/dry/intact  Pt tried to get out FALL  Goal: Free from fall injury  Description  INTERVENTIONS:  - Assess pt frequently for physical needs  - Identify cognitive and physical deficits and behaviors that affect risk of falls.   - [place] fall precautions as indicated by assessment.  - Educ Progressing     Problem: Safety Risk - Non-Violent Restraints  Goal: Patient will remain free from self-harm  Description  INTERVENTIONS:  - Apply the least restrictive restraint to prevent harm  - Notify patient and family of reasons restraints applied  -

## 2019-07-08 NOTE — PHYSICAL THERAPY NOTE
PHYSICAL THERAPY TREATMENT NOTE - INPATIENT    Room Number: 3563/6267-V     Session: 2   Number of Visits to Meet Established Goals: 5     History related to current admission: 61 yo male with hx of ETOH cirrhosis, ascites and encephalopathy admitted 7/3/ Ben Phillip MD at [address] [place] [address] unintelligible at times    Patient’s self-stated goal is unable to state    OBJECTIVE  Precautions: Seizure;Bed/chair alarm    WEIGHT BEARING RESTRICTION  Weight Bearing Restriction: None further ambulation. Gait training with rw and chair follow with mod a at times for balance, poor technique with rw, narrow JACINTO.   Chair alarm set and RN aware of continued recommendation for TREVOR        Patient End of Session: Up in chair;Needs met;Call lig

## 2019-07-08 NOTE — PLAN OF CARE
Assumed care at 0730. A&O x 2. On RA tolerating well. NSR on tele. CIWA d/c'd. Soft restraints d/c'd order renewed for posey vest. Will attempt to get patient out of posey today safely. ABT per MAR.  Morning lactulose held, 3 BM before 9 AM will given later injury  Description  INTERVENTIONS:  - Assess pt frequently for physical needs  - Identify cognitive and physical deficits and behaviors that affect risk of falls.   - [place] fall precautions as indicated by assessment.  - Educate pt/family on patient sa - Non-Violent Restraints  Goal: Patient will remain free from self-harm  Description  INTERVENTIONS:  - Apply the least restrictive restraint to prevent harm  - Notify patient and family of reasons restraints applied  - Assess for any contributing factors

## 2019-07-08 NOTE — PLAN OF CARE
Assumed care at [address]. Pt is A&O x2. Confused and agitated at times, but otherwise calm throughout shift. On Ra. NSR ST on tele. Briefed and continent. Had x2 Bm, receives lactulose x4 daily. Denies pain. Restraints Q2. Saline locked.    St adequate protection for wounds/incisions during mobilization  - Obtain PT/OT consults as needed  - Advance activity as appropriate  - Communicate ordered activity level and limitations with patient/family  Outcome: Progressing     Problem: SAFETY ADULT - F participate in ADLs to maximize function  - Promote sitting position while performing ADLs such as feeding, grooming, and bathing  - Educate and encourage patient/family in tolerated functional activity level and precautions during self-care     Outcome: P

## 2019-07-08 NOTE — PROGRESS NOTES
[facility]  Progress Note     Mina Brooks Patient Status:  Inpatient    1960 MRN [id number]   [facility] 3NE-A Attending Sandy Dillard MD   Hosp Day # 5 PCP None Pcp     Chief Complaint: joint pain    S: Patient restless, Intravenous Q24H   • furosemide  40 mg Oral Daily   • spironolactone  100 mg Oral Daily   • Pantoprazole Sodium  40 mg Oral BID AC   • rifaximin  550 mg Oral BID   • Thiamine HCl  100 mg Oral Daily   • multivitamin with minerals  1 tablet Oral Daily   • fo AST 35  --  47* 51*   ALT 16  --  17 19   BILT 10.3*  --  14.0* 12.2*   TP 6.2*  --  6.1* 6.1*     Plan as outlined above  Discussed with patient and RN  Alejandro PINA

## 2019-07-08 NOTE — BH PROGRESS NOTE
Talked with the pts nurse, Ricardo  to see how the pt is doing. She said, the pt is still in restraints and CIWA is d/c. Informed the nurse if she needed anything from the psychiatrist, Dr. Obdulia Mooney let this liaison know or call her directly.

## 2019-07-08 NOTE — PROGRESS NOTES
Gastroenterology Follow-Up Note      Gib Brim Patient Status:  Inpatient    1960 MRN [id number]   [facility] 3NE-A Attending Roseline Moreno MD   Hosp Day # 5 PCP None Pcp     Chief Complaint/Reason for Follow Up: 58 year- INR      [address] [facility]  Gastroenterology/Advanced Endoscopy  [facility]

## 2019-07-09 NOTE — CM/SW NOTE
Rn, CM, MSW discussed patient in care rounds. Pt is off restraints at this time.  Will follow for Re-eval PT

## 2019-07-09 NOTE — OCCUPATIONAL THERAPY NOTE
Attempted to see the pt for OT session. Pt commented, \"Just leave me alone! You are bothering me. \"  Will continue to follow.

## 2019-07-09 NOTE — PLAN OF CARE
Assumed patient care at 0730. VSS. Room air. Tele, NSR/ST. A&Ox2. Confusion. Denies pain. Prn Haldol x1 for restlessness. Briefed and continent, scheduled lactulose QID. IV Rocephin. PT re-eval once patient is back to baseline/stable.   Will brielle assessment.  - Educate pt/family on patient safety including physical limitations  - Instruct pt to call for assistance with activity based on assessment  - Modify environment to reduce risk of injury  - Provide assistive devices as appropriate  - Consider applied  - Assess for any contributing factors to confusion (electrolyte disturbances, delirium, medications)  - Discontinue any unnecessary medical devices as soon as possible  - Assess the patient's physical comfort, circulation, skin condition, hydratio

## 2019-07-09 NOTE — PROGRESS NOTES
Gastroenterology Follow-Up Note      Obi Hippo Patient Status:  Inpatient    1960 MRN [id number]   [facility] 3NE-A Attending Collin Colon MD   Hosp Day # 6 PCP None Pcp     Chief Complaint/Reason for Follow Up: 58 year-

## 2019-07-09 NOTE — PLAN OF CARE
Assumed care at [address]. Pt is A&O x2. Confused and agitated at times, but otherwise calm throughout shift. On RA.   NSR ST on tele at times. Briefed and continent. Had x4 BMs in total today, held lactulose. Denies pain. Restraints d/c'd.    Jan Castanon injury  Description  INTERVENTIONS:  - Assess pt frequently for physical needs  - Identify cognitive and physical deficits and behaviors that affect risk of falls.   - [place] fall precautions as indicated by assessment.  - Educate pt/family on patient sa - Non-Violent Restraints  Goal: Patient will remain free from self-harm  Description  INTERVENTIONS:  - Apply the least restrictive restraint to prevent harm  - Notify patient and family of reasons restraints applied  - Assess for any contributing factors

## 2019-07-09 NOTE — PROGRESS NOTES
[facility]  Progress Note     Bala Santo Patient Status:  Inpatient    1960 MRN [id number]   [facility] 3NE-A Attending Saroj Cooper MD   Hosp Day # 6 PCP None Pcp     Chief Complaint: Encephalopathy    S: Patient awake 100 mg Oral Daily   • Pantoprazole Sodium  40 mg Oral BID AC   • rifaximin  550 mg Oral BID   • Thiamine HCl  100 mg Oral Daily   • multivitamin with minerals  1 tablet Oral Daily   • folic acid  1 mg Oral Daily   • lactulose  20 g Oral QID       ASSESSMEN

## 2019-07-10 NOTE — PLAN OF CARE
Assumed patient care at 1900  Patient A&O to person and place  No complaints of pain or discomfort  Seizure precautions in place  VSS  Tele-SR  BP's running on the lower side  Daily weight  Will await further MD recommendations  Patient currently resting i of falls.   - [place] fall precautions as indicated by assessment.  - Educate pt/family on patient safety including physical limitations  - Instruct pt to call for assistance with activity based on assessment  - Modify environment to reduce risk of injury harm  - Notify patient and family of reasons restraints applied  - Assess for any contributing factors to confusion (electrolyte disturbances, delirium, medications)  - Discontinue any unnecessary medical devices as soon as possible  - Assess the patient's

## 2019-07-10 NOTE — PROGRESS NOTES
PARTH HOSPITALIST  Progress Note     Ganga Sidle Patient Status:  Inpatient    1960 MRN [id number]   [facility] 3NE-A Attending Marilu Vizcarra MD   Hosp Day # 7 PCP None Pcp     Chief Complaint: Encephalopathy    S: Patient awake reviewed in Epic.     Medications:   • cefTRIAXone  1 g Intravenous Q24H   • furosemide  40 mg Oral Daily   • spironolactone  100 mg Oral Daily   • Pantoprazole Sodium  40 mg Oral BID AC   • rifaximin  550 mg Oral BID   • Thiamine HCl  100 mg Oral Daily   •

## 2019-07-10 NOTE — PROGRESS NOTES
[facility]  Psychiatric Progress Note    Catalino Gardner YOB: 1960   Age/Gender 62year old male MRN [id number]   [facility] 3NE-A Attending Priscilla Graves, [address] [facility] [address] Day # 7 PCP None Pcp     Date of Service: 7/10/201 restraints. Patient still with elevated ammonia levels and likely still encephalopathic. Objective    Patient initially drowsy and slurred in speech but slowly became more alert. Disheveled and unkempt. Fair eye contact.   Mood dysphoric and mildly ir

## 2019-07-10 NOTE — DIETARY NOTE
[address] [facility]     Admitting diagnosis:  Hepatic encephalopathy ([facility] [address] [K72.90]  Dehydration [E86.0]  Weakness generalized [R53.1]  Jaundice [R17]    Ht: 170.2 cm (5' 7\")  Wt: 78.2 kg (172 lb 6.4 oz).  This is 116 %

## 2019-07-10 NOTE — PLAN OF CARE
Patient much more alert and oriented this shift. Patient in pleasant mood with staff. Tele and NSR.  and RA. Bed alarm on. Unsuccessful with BM's with oral lactulose, GI informed and converted to Lactulose enemas.  Two massive BM's were noted after initi

## 2019-07-11 NOTE — PROGRESS NOTES
Gastroenterology Follow-Up Note      Josiane Gusman Patient Status:  Inpatient    1960 MRN [id number]   [facility] 3NE-A Attending Kimmy Haley MD   Hosp Day # 8 PCP None Pcp     Chief Complaint/Reason for Follow Up: 58 year-

## 2019-07-11 NOTE — CONSULTS
[address] [facility]  [id number]  Hospital Day #8  Date of Consult: 07/11/19  Patient seen at: [facility]    Reason for Consultation:      Consult requested by Dr. Steffany Montgomery for evaluation of pa by Jacqueline Bradshaw MD at [facility] ENDOSCOPY   • ESOPHAGOGASTRODUODENOSCOPY (EGD) N/A 6/27/2019    Performed by Ambika Rivas MD at [facility] ENDOSCOPY   • ESOPHAGOGASTRODUODENOSCOPY (EGD) N/A 5/31/2019    Performed by Ambika Rivas MD at [facility] ENDOSCOPY   • retention enema 200 g Rectal 4 times per day   ARIpiprazole (ABILIFY) tab 2.5 mg 2.5 mg Oral BID   LORazepam (ATIVAN) tab 1 mg 1 mg Oral Q6H PRN   haloperidol lactate (HALDOL) 5 MG/ML injection 1 mg 1 mg Intravenous Q2H PRN   furosemide (LASIX) tab 40 mg 4 effort. Abdomen: Soft, non-tender, distended, normal bowel sounds X 4 quadrants, no rebound or guarding  Extremities: Without clubbing, cyanosis. Peripheral pulses are 2+. Mild BLE Edema present, per Liliane Enter improved/normal\" since admission.    Mendez Barbosa when he was \"vomiting up blood\". They shared that he now has Medicaid insurance on a \"spend down\" which they were happy about. We discussed the differences between palliative care and hospice care again.  [facility] but she is worried she will be judged by them. The burden of being his HCPOA is weighing on her. She noted that Adilene Raza was the most lucid he has been when talking with me. Earlier this morning he called her asking for the [place] police to come.  She is h Hyperglycemia     Chest pain of uncertain etiology     Alcoholic cirrhosis of liver with ascites (HCC)     Coagulopathy (HCC)     Shortness of breath     Generalized edema     Jaundice     Alcohol withdrawal syndrome, with delirium ([facility] [address]     Palliative car

## 2019-07-11 NOTE — PROGRESS NOTES
PARTH HOSPITALIST  Progress Note     Solis Omalley Patient Status:  Inpatient    1960 MRN [id number]   [facility] 3NE-A Attending Caroline Watson MD   Hosp Day # 8 PCP None Pcp     Chief Complaint: Encephalopathy    S: Patient awake TROP, CK in the last 168 hours. Imaging: Imaging data reviewed in Epic.     Medications:   • lactulose (ENULOSE) 200 gm retention enema  200 g Rectal 4 times per day   • ARIpiprazole  2.5 mg Oral BID   • cefTRIAXone  1 g Intravenous Q24H   • furosem

## 2019-07-11 NOTE — PLAN OF CARE
Patient is a&ox3. Alertness and orientation approximate to the same as yesterday. Patient and his girlfriend met with palliative care this afternoon. Denies any pain. 3 BM prior to noon lactulose enema. Enema held per GI recommendations of 3-4 BM a day.  Te

## 2019-07-11 NOTE — OCCUPATIONAL THERAPY NOTE
OCCUPATIONAL THERAPY TREATMENT NOTE - INPATIENT     Room Number: 3075/4102-M  Session: 1   Number of Visits to Meet Established Goals: 5    Presenting Problem: generalized weakness, hepatic encephalopathy, dehydration, jaundice     History related to curre Performed by Roberto Avalos MD at [facility]  \"I am messing this up. \" Pt was trying to tell a joke, but couldn't find words to complete it.       OBJECTIVE  Precautions: Seizure;Bed/chair alarm    WEIGHT BEARING RESTRICTION  Weight alertness, but continues to present with the following limitations: inability to process multi-step instructions, impaired safety awareness, impaired insight, impaired ability to attend to surroundings, decreased standing balance, and decreased standing en

## 2019-07-11 NOTE — PLAN OF CARE
Assumed patient care at 1900  Patient A to person, place, and situation overnight  No current complaints of pain or discomfort  VSS  Tele-SR  Abdomen distended  Swelling to B/L LE decreased. Patient had 3 bowel movements after first lactulose enema.  1800 injury  Description  INTERVENTIONS:  - Assess pt frequently for physical needs  - Identify cognitive and physical deficits and behaviors that affect risk of falls.   - [place] fall precautions as indicated by assessment.  - Educate pt/family on patient sa - Non-Violent Restraints  Goal: Patient will remain free from self-harm  Description  INTERVENTIONS:  - Apply the least restrictive restraint to prevent harm  - Notify patient and family of reasons restraints applied  - Assess for any contributing factors

## 2019-07-11 NOTE — PHYSICAL THERAPY NOTE
PHYSICAL THERAPY TREATMENT NOTE - INPATIENT    Room Number: 4043/7772-K     Session: 3  Number of Visits to Meet Established Goals: 5     History related to current admission: 61 yo male with hx of ETOH cirrhosis, ascites and encephalopathy admitted 7/3/1 Josselin Cadet MD at [facility]  Pt lethargic, calm and cooperative, Speech unintelligible at times    Patient’s self-stated goal is unable to state    OBJECTIVE  Precautions: Seizure;Bed/chair alarm    WEIGHT BEARING RESTRICTION  Weight Be and safety awareness. Pt returned to bedside chair with CGA. Updated pt and girlfriend on role of PT, POC, DC planning/recs, positioning.,activity.          Patient End of Session: Up in chair;Needs met;Call light within reach;RN aware of session/findings;A

## 2019-07-12 NOTE — PROGRESS NOTES
PARTH HOSPITALIST  Progress Note     Didi Goodridge Patient Status:  Inpatient    1960 MRN [id number]   [facility] 3NE-A Attending Adrian Cormier MD   Hosp Day # 9 PCP None Pcp     Chief Complaint: Encephalopathy    S: Patient awaken folic acid  1 mg Oral Daily       ASSESSMENT / PLAN:     1. Acute delirium with agitation  2. Hepatic encephalopathy  3. Alcoholic cirrhosis with ascites sp paracentesis  4. Alcohol abuse/withdrawal, resolved  5. Coagulopathy d/t cirrhosis  6.  Anemia, stab symptom management x 24-48 hours. Plan would be for home thereafter. SW updated. RN updated. Send message to Palliative Care APN. > 60 minutes spent in sones 64 discussion.     PTailor MD

## 2019-07-12 NOTE — PROGRESS NOTES
Palliative Care  Per notes, worsening hallucinations and agitation this morning. Received haldol. Dr. Lakeshia Stringer and Dr. Bernadette Garcia have spoken to [facility] friend/ETHEL Bower.  Hospice consult has been placed for Residential with likely inpatient status with possib

## 2019-07-12 NOTE — HOME CARE LIAISON
Will arrange palliative services for patient. Any insurance related questions will be addressed in a welcome call after the patient discharges. Thank you for the referral.     [phone]: Received notification that the patient does not have straight Medicaid.  RH

## 2019-07-12 NOTE — PROGRESS NOTES
Gastroenterology Follow-Up Note      Kanwal Howard Patient Status:  Inpatient    1960 MRN [id number]   [facility] 3NE-A Attending Amira Aparicio MD   Hosp Day # 9 PCP None Pcp     Chief Complaint/Reason for Follow Up: 58 year-

## 2019-07-12 NOTE — PROGRESS NOTES
07/12/19 1325   Clinical Encounter Type   Visited With Patient   Continue Visiting Yes   Sacramental Encounters   Sacrament of Sick-Anointing Visiting  anointed patient   The patient was seen by Siobhan Cole.  Received prayer, Scripture

## 2019-07-12 NOTE — PLAN OF CARE
0000 Lactulose enema was a success, was able to hold it well, and then patient had large BM. Patient A&O x1-2, very forgetful, repeatedly thinking he is at home, seconds after being told he is at the hospital.  No c/o pain at this time.       [address]

## 2019-07-12 NOTE — PLAN OF CARE
Pt agitated, impulsive, refusing to go back in bed this AM. Pt verbally abusive towards staff, hallucinating. Security called, pt eventually getting back in bed safely. PRN IV haldol given. Pt noted to be sleeping majority of this RN's shift.   Pt attempti that affect risk of falls.   - [place] fall precautions as indicated by assessment.  - Educate pt/family on patient safety including physical limitations  - Instruct pt to call for assistance with activity based on assessment  - Modify environment to redu

## 2019-07-12 NOTE — CM/SW NOTE
Spoke with Dr Eddy Varma, order for hospice evaluation, [facility]. [facility]     Edwin Thompson RN,   Phone [phone]  Pager [phone]

## 2019-07-13 NOTE — PROGRESS NOTES
PARTH HOSPITALIST  Progress Note     Aamir Charter Patient Status:  Inpatient    1960 MRN [id number]   [facility] 3NE-A Attending Kt Mcdaniels MD   Hosp Day # 10 PCP None Pcp     Chief Complaint: Encephalopathy    S: Patient is aw ASSESSMENT / PLAN:     1. Acute delirium with agitation  2. Hepatic encephalopathy  3. Alcoholic cirrhosis with ascites sp paracentesis  4. Alcohol abuse/withdrawal, resolved  5. Coagulopathy d/t cirrhosis  6. Anemia, stable >7  7.  Thrombocytopenia d

## 2019-07-13 NOTE — PLAN OF CARE
Patient is A&O x1-2. Irritable but has not become agitated this shift. Drowsy, words slurred at times  Took medications without complications. VSS. On tele-NSR. On Ra. IV-Sl. No hallucinations thus far. Tolerated lactulose enema well.   Goal of 3-4 B neurological status  Description  INTERVENTIONS  - Assess for and report changes in neurological status  - Initiate measures to prevent increased intracranial pressure  - Maintain blood pressure and fluid volume within ordered parameters to optimize cerebr

## 2019-07-13 NOTE — PLAN OF CARE
Patient is A&O x1. Agitated at times. Drowsy. Attempted to get out of bed at beginning of shift. Pt redirected, pt now laying in bed comfortably. Sleeping most of shift thus far. Took medications without complications. VSS. On tele-NSR. On Ra.   IV-S SAFETY ADULT - FALL  Goal: Free from fall injury  Description  INTERVENTIONS:  - Assess pt frequently for physical needs  - Identify cognitive and physical deficits and behaviors that affect risk of falls.   - [place] fall precautions as indicated by asse Outcome: Progressing     Problem: Safety Risk - Non-Violent Restraints  Goal: Patient will remain free from self-harm  Description  INTERVENTIONS:  - Apply the least restrictive restraint to prevent harm  - Notify patient and family of reasons restraints

## 2019-07-13 NOTE — PROGRESS NOTES
Brief GI note:     Appreciate palliative care and hospitalist assistance with goals of care. Agree with hospice given his poor prognosis and low quality of life, though understand complex social dynamic.  Medically, recommend continuation of present managem

## 2019-07-14 NOTE — PROGRESS NOTES
07/14/19 1632   Clinical Encounter Type   Visited With Patient not available  (Patient was sleeping.)   Continue Visiting Yes

## 2019-07-14 NOTE — HOSPICE RN NOTE
Saw pt bedside. Pt irritated because he isn't \"allowed\" to walk. Observed pt attempting to walk and is very unbalanced. Explained to pt that staff is concerned about his safety and don't want him to fall.  Still awaiting to hear back from girlfriend cely

## 2019-07-14 NOTE — PROGRESS NOTES
PARTH HOSPITALIST  Progress Note     Solsi Omalley Patient Status:  Inpatient    1960 MRN [id number]   [facility] 3NE-A Attending Caroline Watson MD   Hosp Day # 11 PCP None Pcp     Chief Complaint: Encephalopathy    S: Patient is aw agitation  2. Hepatic encephalopathy  3. Alcoholic cirrhosis with ascites sp paracentesis  4. Alcohol abuse/withdrawal, resolved  5. Coagulopathy d/t cirrhosis  6. Anemia, stable >7  7. Thrombocytopenia d/t bone marrow suppression, stable  8. GERD  9.

## 2019-07-14 NOTE — PLAN OF CARE
Patient is A&O x1-2. Irritable but has not become agitated this shift. Drowsy, words slurred at times  Took medications without complications. VSS. On tele-NSR. On Ra. IV-Sl. No hallucinations thus far.   Lactulose switched back to oral.   Goal of 3-4 transferring and ambulating w/ or w/o assistive devices  - Assist with transfers and ambulation using safe patient handling equipment as needed  - Ensure adequate protection for wounds/incisions during mobilization  - Obtain PT/OT consults as needed  - Adv Daily Living  Goal: Achieve highest/safest level of independence in self care  Description  Interventions:  - Assess ability and encourage patient to participate in ADLs to maximize function  - Promote sitting position while performing ADLs such as feeding

## 2019-07-14 NOTE — PLAN OF CARE
Patient is A&O x1-3. Irritable at times. Drowsy. No attempts of getting out of bed. Following command. At beginning of shift pt increased alertness. Slept about 4 hours and then upon waking up pt alertness decreased and was more confused.    Took med Progressing     Problem: SAFETY ADULT - FALL  Goal: Free from fall injury  Description  INTERVENTIONS:  - Assess pt frequently for physical needs  - Identify cognitive and physical deficits and behaviors that affect risk of falls.   - [place] fall precaut precautions during self-care     Outcome: Progressing     Problem: Safety Risk - Non-Violent Restraints  Goal: Patient will remain free from self-harm  Description  INTERVENTIONS:  - Apply the least restrictive restraint to prevent harm  - Notify patient a

## 2019-07-14 NOTE — PHYSICAL THERAPY NOTE
PHYSICAL THERAPY TREATMENT NOTE - INPATIENT    Room Number: 4465/6838-R     Session: 4  Number of Visits to Meet Established Goals: 5     History related to current admission: 61 yo male with hx of ETOH cirrhosis, ascites and encephalopathy admitted 7/3/1 ESOPHAGOGASTRODUODENOSCOPY (EGD) N/A 2/13/2018    Performed by Donte Brennan MD at [facility]  Pt lethargic.    Patient’s self-stated goal is unable to state    OBJECTIVE  Precautions: Seizure;Bed/chair alarm    WEIGHT BEARING RESTR Pt performed standing marching with cues. Min assist to maintain balance. Pt amb 200' with Rw and cga, very slow yung, cues to remain in straight path, visual reference provided to assist from drifting to one side of hallway.    Pt performed seated ex: a minimum assistance  Met 7/11  New: 200' with supervision      Goal #4     Goal #5     Goal #6     Goal Comments: Goals established on 7/4/2019; goals in progress 7/14/2019

## 2019-07-15 PROBLEM — K70.30 ALCOHOLIC CIRRHOSIS OF LIVER (HCC): Status: ACTIVE | Noted: 2019-01-01

## 2019-07-15 NOTE — CM/SW NOTE
9:44am  MSW called  registration Medicaid is Inelgiable at this time. MSW paged [facility] to update on above. MSW called [address] to review case. Pt has not met his spend down.  Varun Haskins from [facility] will call pt's HCPOA to get permission to submi

## 2019-07-15 NOTE — PLAN OF CARE
Assumed care at 0730. A&O x 1-2. On RA tolerating well. NSR on tele. Patient in soft wrist restraints and posey vest. PRN haldol given overnight with good results.  Patient no resting comfortable but verbally aggressive when Praveen Oliver the girlfriend is in the r Problem: MUSCULOSKELETAL - ADULT  Goal: Return mobility to safest level of function  Description  INTERVENTIONS:  - Assess patient stability and activity tolerance for standing, transferring and ambulating w/ or w/o assistive devices  - Assist with trans in tolerated activity level and precautions  - Recommend use of  RW for transfers and ambulation, gait belt, min-mod A   Outcome: Progressing     Problem: Impaired Activities of Daily Living  Goal: Achieve highest/safest level of independence in self care

## 2019-07-15 NOTE — PLAN OF CARE
Patient is A&O x1. Irritable and agitated. Took medications without complications. VSS. On tele-NSR. On Ra. IV-Sl. Goal of 3-4 BM per day. Briefed. Lactulose transitioned to PO. Daily weight.   Increased agitation, pt called 911 stated that he wa handling equipment as needed  - Ensure adequate protection for wounds/incisions during mobilization  - Obtain PT/OT consults as needed  - Advance activity as appropriate  - Communicate ordered activity level and limitations with patient/family  Outcome: Pr ability and encourage patient to participate in ADLs to maximize function  - Promote sitting position while performing ADLs such as feeding, grooming, and bathing  - Educate and encourage patient/family in tolerated functional activity level and precaution

## 2019-07-15 NOTE — PROGRESS NOTES
[address] Follow Up    Norma Abraham  [id number]  Patient seen at: [facility]    Subjective:      Patient sleeping quietly.  Danyell Pew outside room, tearful at times noting Haven Taoist talking mean to her, \"get me out o CO2 27.0 07/10/2019     (H) 07/10/2019    CA 8.2 (L) 07/10/2019    ALB 2.5 (L) 07/10/2019    ALKPHO 68 07/10/2019    BILT 12.0 (H) 07/10/2019    TP 5.8 (L) 07/10/2019    AST 48 (H) 07/10/2019    ALT 16 07/10/2019    MG 2.1 07/04/2019    TROP <0.045 support provided to friend Johanna Olsen. She notes she has not been sleeping well. She states that she has a girlfriend that she reached out to but she has not called her back. I encouraged her to reach out again.  She has reached out to [facility] brother (does not l the risks vs benefits of life sustaining treatments in the setting of his acute/chronic illness and co-morbities. Jerri Tim feels comfortable changing code status to DNR after speaking with Jose Francisco's brother. ORDER PLACED FOR DNR.     HCPOA:      Healthcare Agent follow peripherally. Call out to Dr. Kristopher Valdes to discuss.      CORNEL Galloway  Palliative Care   Phone: [phone]     7/15/2019  10:42 AM

## 2019-07-15 NOTE — HOSPICE RN NOTE
Hospice RN and SW met with girlfriend Vinnie Cueto. Consents for hospice signed. Per pt, he has \"very severe\" pain in lower abdomen and has had episodes of agitation/aggressiveness requiring restraints overnight and had called 911.   Lethargic at time of exam

## 2019-07-15 NOTE — PROGRESS NOTES
07/15/19 6217   Clinical Encounter Type   Visited With Patient   Routine Visit Follow-up   Continue Visiting No  (upon request or as needed)   Patient Spiritual Encounters   Spiritual Interventions  provided spiritual support through prayers and

## 2019-07-15 NOTE — H&P
PARTH HOSPITALIST  History and Physical     Damon Radha Patient Status:  Inpatient    1960 MRN [id number]   [facility] 3NE-A Attending Miranda Lopez MD   Hosp Day # 0 PCP None Pcp     Chief Complaint: AMS    History of Present History:  reports that he has been smoking. He has a 15.00 pack-year smoking history. He has never used smokeless tobacco. He reports that he drinks alcohol. He reports that he does not use drugs.     Family History:   Family History   Problem Relation Age weight. ). Recent Labs   Lab 07/10/19  0630   PTP 27.1*   INR 2.34*       No results for input(s): TROP, CK in the last 168 hours. Imaging: Imaging data reviewed in Epic. ASSESSMENT / PLAN:     1. Acute delirium with agitation  2.  Hepatic encepha

## 2019-07-15 NOTE — DISCHARGE SUMMARY
[facility] [place] HOSPITALIST  DISCHARGE SUMMARY     Diana Toro Patient Status:  Inpatient    1960 MRN [id number]   [facility] 3NE-A Attending Divian Lawler MD   [facility] Day # 12 PCP None Pcp     Date of Admission: 7/3/2019  Date of Discharge: associated nausea vomiting. Denies any fever chills. Legs feel heavy according to patient and has difficulty lifting the legs off the bed due to the significant edema of the legs. Denies any headache.   Denies any dysuria frequency       Brief Synopsis:

## 2019-07-15 NOTE — PROGRESS NOTES
07/15/19 1324   Clinical Encounter Type   Visited With Health care provider;Patient not available  (Patient sleeping.)   Continue Visiting Yes   Referral To Nurse;Physician  (Per nurse, hospice caregivers are taking care of the POLST form. )

## 2019-07-16 PROBLEM — Z51.5 END OF LIFE CARE: Status: ACTIVE | Noted: 2019-01-01

## 2019-07-16 NOTE — PLAN OF CARE
PT DROWSY AT TIMES, EASILY AROUSABLE,CONFUSED, TRYING TO GET OUT OF BED, RESTRAINTS IN PLACE, BED ALARM, GIVEN HALDOL, MORPHINE FOR PAIN, RA, GENERALIZED EDEMA, ASCITES, NO URINE OUTPUT, CONTINUE COMFORT CARE.     Problem: Safety Risk - Non-Violent Restrain

## 2019-07-16 NOTE — PROGRESS NOTES
PARTH HOSPITALIST  Progress Note     Homeroaleisha Toro Patient Status:  Inpatient    1960 MRN [id number]   [facility] 3NE-A Attending Bettye Ca MD   Hosp Day # 1 PCP None Pcp     Chief Complaint: Encephalopathy    S: Patient report Received haldol/abilify/morphine - each x2 overnight. Robin PERRY  9:44 AM     Addendum:    Agree with above note. Pt. Seen and examined. Gen: NAD  CVS: s1s2  Resp: CTA  Abd: soft    -appears better today  -cont.  Comfort care    Sidney Gilford

## 2019-07-16 NOTE — PROGRESS NOTES
07/16/19 1519   Clinical Encounter Type   Visited With Patient and family together   Patient's Supportive Strategies/Resources Father Mary Back provided prayer, Scripture, support, and Sacrament of the Sick.     Sacramental Encounters   Sacrament of Sick-[place]oin

## 2019-07-16 NOTE — HOSPICE RN NOTE
BABATUNDE farias2. Pt. sitting up having breakfast at time of visit. He appeared to be awake but quickly closed eyes when spoken to and then dozed off with deep, slow breathing, head back. RR 8, no congestion, no dyspnea.   Continues to mumble but somewhat more cl

## 2019-07-16 NOTE — SPIRITUAL CARE NOTE
Supportive visit to offer emotional/spiritual support. CHP to follow up later when girlfriend present.  Report made to [facility] hospice team

## 2019-07-17 NOTE — PROGRESS NOTES
PARTH HOSPITALIST  Progress Note     Drew Alvarado Patient Status:  Inpatient    1960 MRN [id number]   [facility] 3NE-A Attending Yobany Bazan MD   Hosp Day # 2 PCP None Pcp     Chief Complaint: Encephalopathy    S: Patient requir

## 2019-07-17 NOTE — PLAN OF CARE
Resumed care of pt. At [address] [place] [address]. Pt. Resting in bed with significant other at bedside. Assisted pt. To use the urinal.  Pt. Nodding off, very drowsy. Significant other at bedside and overnight  DNR-Hospice pt.    Morphine given via MAR for pain      Problem:

## 2019-07-17 NOTE — HOSPICE RN NOTE
GIP Day 3:   Pt lying in bed sleeping left side, minimally responsive to verbal & tactile stimuli. Pt easily agitated and began moving around with stimuli.  RN Mariposa states pt has required 2 doses of IM Haldol today to manage agitation, last dose around 10am

## 2019-07-17 NOTE — PROGRESS NOTES
This am pt was alert to self and place, disoriented to situation and time. He was very uncomfortable in bed, trying to frequently reposition himself, complaining of pain in both his back and abdomen.  I gave him one dose of oral morphine at this time(9:18a

## 2019-07-18 NOTE — HOSPICE RN NOTE
BABATUNDE wynne. Justyn Caicedo was sitting upright in bed with legs crossed, eyes open and then dozing off and on. He responded to Sathish and smiled and laughed when she was with him. RR continues 8-10 with 10-15 sec apneic periods.   NO accessory muscle use or congest

## 2019-07-18 NOTE — PROGRESS NOTES
Sathish, and pt's two sisters at bedside- questions answered. Pt became very anxious this afternoon, continuing to try to get out of bed and saying that he needs to leave. Family at this time became upset that he was so distressed. Haldol given.  About half a

## 2019-07-18 NOTE — PROGRESS NOTES
Pt complaining of pain- unable to provide pain score.  Nonverbal signs of pain- gripping both sides of bed tensed, leaned forward, trying to reposition self in bed, eyebrows furrowed and face scrunched up all support that the current dose of continuous morp

## 2019-07-18 NOTE — PLAN OF CARE
Resumed care of pt. At [address]. Please see prior note by this writer. DNR-Hospice  Haldol x1 dose given via MAR  Morhphine IVP x4 doses given via MAR. When pt.  Wakes up from brief periods of sleep, he is extremely fidgety, sitting straight up in bed, holdi

## 2019-07-18 NOTE — PROGRESS NOTES
[facility]  Progress Note     Wilhemina Clubs Patient Status:  Inpatient    1960 MRN [id number]   [facility] 3NE-A Attending Alexandria Kline MD   Hosp Day # 3 PCP None Pcp     Chief Complaint: Encephalopathy    S: Patient requir

## 2019-07-18 NOTE — PROGRESS NOTES
This AM during nurse report pt set off bed alarm attempting to get out of bed, with leg already swung off of bed, tried to redirect, pt grabbed this RN's hands and said that \"the bed doesn't want me, you don't understand\" and said \"he had to get out of

## 2019-07-18 NOTE — PROGRESS NOTES
2025  Pt. Has attempted numerous times to get out of bed, setting off bed alarm. Pt. Repositioned. Pt. Rocking back in forth in bed, stating his pain is 7/10. Pt. Hard to follow commands and re-orient.

## 2019-07-18 NOTE — SPIRITUAL CARE NOTE
chp present to provide emotional/spiritual support to pt and family at bedside. PT appears comfortable, reports no pain. Family at bedside engaged, and appears to be coping in an expected manner at this time.

## 2019-07-19 NOTE — PROGRESS NOTES
07/19/19 0713   Clinical Encounter Type   Visited With Patient not available;Family  (Patient sleeping.  Family present, girlfriend and one sister. )   Crisis Visit Patient actively dying   Referral From Nurse Farzad Bland)   Referral To    Religiou

## 2019-07-19 NOTE — PROGRESS NOTES
[facility]  Progress Note     Solis Omalley Patient Status:  Inpatient    1960 MRN [id number]   [facility] 3NE-A Attending Caroline Watson MD   Hosp Day # 4 PCP None Pcp     Chief Complaint: Encephalopathy    S: Patient more c

## 2019-07-19 NOTE — PLAN OF CARE
Resumed care of pt. At [address]. Significant other at bedside. Hospice  Morphine PCA-3mg continuous  Pt. Slightly fidgety and tension on his face and arms tensed holding onto bed rails, but pt. At about 2200 pt. Then relaxed and resting comfortably.   Aguilar cummings pre-medicate as appropriate  Outcome: Progressing

## 2019-07-19 NOTE — PLAN OF CARE
Assumed care of patient at [address] [place] [address]. Patient responding only briefly, opening his eyes to new family members coming in the room. Minimally responsive. On RA. Saturating 90-92%. Respiratory: 6 RPM.  No telemetry.   PCA pump infusing; on hospice care with resi

## 2019-07-19 NOTE — HOSPICE RN NOTE
BABATUNDE farias5. Unresponsive at time of visit. Xochitl Roselia said that he responded briefly to her when she came early this AM; no response noted during assmt. RR 4-5, no congestion or accessory muscle use on MS 3mg/h and does not show s/s of pain at this time.   Xochitl Veloz

## 2019-07-20 NOTE — PROGRESS NOTES
07/20/19 1233   Clinical Encounter Type   Visited With Patient and family together  (Pt.'s girlfriend at bedside)   Routine Visit Follow-up  (Referral from RN)   Crisis Visit Patient actively dying   Patient's Supportive Strategies/Resources  of

## 2019-07-20 NOTE — PLAN OF CARE
Patient put on 3Ls for comfort, sats from low 80s to 90s. Patient continues to be lethargic, opening his eyes a little when his head is moved. Patient suctioned for fluid build up, and deep suctioned x1 with help from RT.   Morphine drip still at 4mg/hr,

## 2019-07-20 NOTE — PLAN OF CARE
Assumed care of patient at [facility]. Patient unresponsive, not opening his eyes today. On 8L oxygen, saturating in the 60's. No telemetry. On hospice care with residential.  20 RPM today. -120's. Will continue to monitor closely.     1035: Per MD-- distraction and/or relaxation techniques  - Monitor for opioid side effects  - Notify MD/LIP if interventions unsuccessful or patient reports new pain  - Anticipate increased pain with activity and pre-medicate as appropriate  Outcome: Progressing

## 2019-07-20 NOTE — RESPIRATORY THERAPY NOTE
Called to bedside per RN, patient requiring deep suctioning. Large green, blood tinged thick secretions.

## 2019-07-20 NOTE — PROGRESS NOTES
[facility]  Progress Note     Rae Jackson Patient Status:  Inpatient    1960 MRN [id number]   [facility] 3NE-A Attending Nora Barnes MD   Hosp Day # 5 PCP None Pcp     Chief Complaint: Encephalopathy    S: Patient conges

## 2019-07-20 NOTE — HOSPICE RN NOTE
GIP day 6  Patient remains unresponsive. Severely jaundiced. Orders for titration of morphine drip at 15 minute assessment intervals for respiratory comfort. Significant other [employer] present at bedside.  Sylvia tearful and emotional. RN actively listened to

## 2019-07-21 NOTE — PLAN OF CARE
Spoke with [address] about POC. She states she will spend the night tonight. PCA at 17mg/hr and patient getting Ativan PRN, patient calm and secretions have improved.

## 2019-07-21 NOTE — PROGRESS NOTES
Brief Note:  Appears more comfortable today  RR 6  Currently on Morphine 17mg/hr  Continue hospice care  Discussed with ROSS Benjamin MD

## 2019-07-21 NOTE — PLAN OF CARE
Assumed care of patient at [address] [place] [address]. Patient unresponsive, sedated; on hospice. On oxygen for comfort. PCA pump; patient currently appears comfortable. Respirations 4-6/min. Jara with standard drainage bag. Secretions minimal this morning--will monitor.

## 2019-07-21 NOTE — HOSPICE RN NOTE
GIP d.7. Renetta Barillas remains unresponsive with RR 10, deep labored breathing with accessory muscle use on MS 17mg. /h.  Congestion present all lobes. Angelique Locks at bedside; had slept here overnight. Support provided; she is going home to freshen and will return.

## 2019-07-22 NOTE — PROGRESS NOTES
07/21/19 2100   Clinical Encounter Type   Visited With Family; Health care provider   Routine Visit   ( paged by nurse at death)   Continue Visiting No   Crisis Visit Death   Family Spiritual Encounters   Family Coping Sadness  ( provided

## 2019-07-22 NOTE — PROGRESS NOTES
NURSING DISCHARGE NOTE    Discharged Other, (see nursing note) via Cart. Accompanied by Support staff  Belongings Taken by patient/family. Patient  at , [address] at bedside. Residential hospice notified. Attending physician on call notified.

## 2019-07-23 NOTE — DISCHARGE SUMMARY
[facility]OSPITAL[facility]  DISCHARGE SUMMARY     Luana Simmons Patient Status:  Inpatient    1960 MRN [id number]   [facility] 3NE-A Attending No att. providers found   Hosp Day # 6 PCP None Pcp     Date of Admission: 7/15/2019  Date of Disc

## 2024-11-08 NOTE — PLAN OF CARE
Assumed care at 0730. A&O x 4, patient waxes and wanes on alertness. Very drowsy. PRN haldol and morphine given overnight. States he is in no pain right now. Aguilar vest left on, wrist restraints discontinued. Staff will continue to monitor.      Addendum: diaz Satisfactory

## (undated) DEVICE — Device: Brand: DEFENDO AIR/WATER/SUCTION AND BIOPSY VALVE

## (undated) DEVICE — 3M™ RED DOT™ MONITORING ELECTRODE WITH FOAM TAPE AND STICKY GEL, 50/BAG, 20/CASE, 72/PLT 2570: Brand: RED DOT™

## (undated) DEVICE — FILTERLINE NASAL ADULT O2/CO2

## (undated) DEVICE — ENDOSCOPY PACK UPPER: Brand: MEDLINE INDUSTRIES, INC.

## (undated) DEVICE — SPEEDBAND SUPERVIEW SUPER 7

## (undated) DEVICE — ENDOSCOPY PACK - LOWER: Brand: MEDLINE INDUSTRIES, INC.

## (undated) DEVICE — FORCEP RADIAL JAW 4

## (undated) DEVICE — 1200CC GUARDIAN II: Brand: GUARDIAN

## (undated) NOTE — LETTER
Consent to Procedure/Sedation    Date: ___5/31/19_______________    Time: ___0945____________    1. I authorize the performance upon Earlyne Repine the following:    Ultrasound Guided Paracentesis with Imaging of the Abdomen    2.  I authorize  ______LATA Signature of person authorized to consent for patient: Relationship to patient:  ___________________________    ___________________    Witness: ____________________     Date: ______________    Printed: 5/31/2019   9:43 AM    Patient Name: Herminio Harada

## (undated) NOTE — Clinical Note
Your patient was recently seen at the [facility] for a hospital follow-up visit. The visit note is attached. Please contact the clinic with any questions at [phone].     Thank you,  CORNEL Celaya

## (undated) NOTE — LETTER
Tanesha Hightower [address]  [place], [address] [place] [address]    Consent for Operation  Date: __________________                                Time: _______________    1.  I authorize the performance upon Gar Gardner the following operation:  Procedrenetta procedure has been videotaped, the surgeon will obtain the original videotape. The hospital will not be responsible for storage or maintenance of this tape.   7. For the purpose of advancing medical education, I consent to the admittance of observers to the STATEMENTS REQUIRING INSERTION OR COMPLETION WERE FILLED IN.     Signature of Patient:   ___________________________    When the patient is a minor or mentally incompetent to give consent:  Signature of person authorized to consent for patient: ____________ supplements, and pills I can buy without a prescription (including street drugs/illegal medications). Failure to inform my anesthesiologist about these medicines may increase my risk of anesthetic complications. iv.  If I am allergic to anything or have ha Anesthesiologist Signature     Date   Time  I have discussed the procedure and information above with the patient (or patient’s representative) and answered their questions. The patient or their representative has agreed to have anesthesia services.     ___

## (undated) NOTE — LETTER
2/15/2018          Ganga Myrick  [id number]  [address] [phone]    Dear Kimmy Valadez,       Here are the biopsy/pathology findings from your recent EGD (Upper  Endoscopy):    reflux esophagitis - an inflammation of the esophagus due to MARIA TERESA

## (undated) NOTE — LETTER
Consent to Procedure/Sedation    Date: __5/31/19________________    Time: __0945_____________    1. I authorize the performance upon Forest Patrick the following:    US paracentesis with imaging of Abdomen     2.  I authorize DrRadames _________________________ Signature of person authorized to consent for patient: Relationship to patient:  ___________________________    ___________________    Witness: ____________________     Date: ______________    Printed: 5/31/2019   9:41 AM    Patient Name: Obi Meza